# Patient Record
Sex: MALE | Race: WHITE | NOT HISPANIC OR LATINO | Employment: FULL TIME | URBAN - METROPOLITAN AREA
[De-identification: names, ages, dates, MRNs, and addresses within clinical notes are randomized per-mention and may not be internally consistent; named-entity substitution may affect disease eponyms.]

---

## 2017-01-19 ENCOUNTER — COMMUNICATION - HEALTHEAST (OUTPATIENT)
Dept: INTERNAL MEDICINE | Facility: CLINIC | Age: 42
End: 2017-01-19

## 2017-01-19 DIAGNOSIS — F17.200 NEEDS SMOKING CESSATION EDUCATION: ICD-10-CM

## 2017-03-06 ENCOUNTER — COMMUNICATION - HEALTHEAST (OUTPATIENT)
Dept: INTERNAL MEDICINE | Facility: CLINIC | Age: 42
End: 2017-03-06

## 2017-03-06 DIAGNOSIS — F17.200 NEEDS SMOKING CESSATION EDUCATION: ICD-10-CM

## 2017-05-08 ENCOUNTER — OFFICE VISIT - HEALTHEAST (OUTPATIENT)
Dept: INTERNAL MEDICINE | Facility: CLINIC | Age: 42
End: 2017-05-08

## 2017-05-08 ENCOUNTER — COMMUNICATION - HEALTHEAST (OUTPATIENT)
Dept: TELEHEALTH | Facility: CLINIC | Age: 42
End: 2017-05-08

## 2017-05-08 DIAGNOSIS — S99.912A LEFT ANKLE INJURY: ICD-10-CM

## 2017-05-30 ENCOUNTER — COMMUNICATION - HEALTHEAST (OUTPATIENT)
Dept: INTERNAL MEDICINE | Facility: CLINIC | Age: 42
End: 2017-05-30

## 2017-05-30 DIAGNOSIS — G47.00 INSOMNIA: ICD-10-CM

## 2017-10-26 ENCOUNTER — OFFICE VISIT - HEALTHEAST (OUTPATIENT)
Dept: INTERNAL MEDICINE | Facility: CLINIC | Age: 42
End: 2017-10-26

## 2017-10-26 DIAGNOSIS — K13.0 LIP LESION: ICD-10-CM

## 2017-10-26 DIAGNOSIS — G47.00 INSOMNIA: ICD-10-CM

## 2017-10-26 DIAGNOSIS — Z98.52 H/O VASECTOMY: ICD-10-CM

## 2017-10-26 DIAGNOSIS — Z00.00 ROUTINE GENERAL MEDICAL EXAMINATION AT A HEALTH CARE FACILITY: ICD-10-CM

## 2017-10-26 ASSESSMENT — MIFFLIN-ST. JEOR: SCORE: 1594.05

## 2017-10-27 ENCOUNTER — COMMUNICATION - HEALTHEAST (OUTPATIENT)
Dept: INTERNAL MEDICINE | Facility: CLINIC | Age: 42
End: 2017-10-27

## 2017-10-27 ENCOUNTER — AMBULATORY - HEALTHEAST (OUTPATIENT)
Dept: LAB | Facility: CLINIC | Age: 42
End: 2017-10-27

## 2017-10-27 DIAGNOSIS — Z98.52 STATUS POST VASECTOMY: ICD-10-CM

## 2017-10-27 LAB
CHOLEST SERPL-MCNC: 191 MG/DL
FASTING STATUS PATIENT QL REPORTED: YES
HDLC SERPL-MCNC: 59 MG/DL
LDLC SERPL CALC-MCNC: 103 MG/DL
PSA SERPL-MCNC: 1.5 NG/ML (ref 0–2.5)
TRIGL SERPL-MCNC: 147 MG/DL

## 2017-10-31 ENCOUNTER — COMMUNICATION - HEALTHEAST (OUTPATIENT)
Dept: INTERNAL MEDICINE | Facility: CLINIC | Age: 42
End: 2017-10-31

## 2017-10-31 ENCOUNTER — AMBULATORY - HEALTHEAST (OUTPATIENT)
Dept: LAB | Facility: CLINIC | Age: 42
End: 2017-10-31

## 2017-10-31 DIAGNOSIS — Z98.52 STATUS POST VASECTOMY: ICD-10-CM

## 2017-11-08 ENCOUNTER — RECORDS - HEALTHEAST (OUTPATIENT)
Dept: ADMINISTRATIVE | Facility: OTHER | Age: 42
End: 2017-11-08

## 2017-12-08 ENCOUNTER — COMMUNICATION - HEALTHEAST (OUTPATIENT)
Dept: INTERNAL MEDICINE | Facility: CLINIC | Age: 42
End: 2017-12-08

## 2017-12-08 DIAGNOSIS — G47.00 INSOMNIA: ICD-10-CM

## 2017-12-19 ENCOUNTER — OFFICE VISIT - HEALTHEAST (OUTPATIENT)
Dept: INTERNAL MEDICINE | Facility: CLINIC | Age: 42
End: 2017-12-19

## 2017-12-19 ENCOUNTER — RECORDS - HEALTHEAST (OUTPATIENT)
Dept: GENERAL RADIOLOGY | Facility: CLINIC | Age: 42
End: 2017-12-19

## 2017-12-19 DIAGNOSIS — M25.561 RIGHT KNEE PAIN: ICD-10-CM

## 2017-12-19 DIAGNOSIS — M22.2X1 PATELLOFEMORAL PAIN SYNDROME OF RIGHT KNEE: ICD-10-CM

## 2017-12-19 DIAGNOSIS — M25.561 PAIN IN RIGHT KNEE: ICD-10-CM

## 2017-12-21 ENCOUNTER — OFFICE VISIT - HEALTHEAST (OUTPATIENT)
Dept: PHYSICAL THERAPY | Facility: REHABILITATION | Age: 42
End: 2017-12-21

## 2017-12-21 DIAGNOSIS — R53.1 DECREASED STRENGTH: ICD-10-CM

## 2017-12-21 DIAGNOSIS — M25.561 PATELLOFEMORAL ARTHRALGIA OF RIGHT KNEE: ICD-10-CM

## 2018-01-04 ENCOUNTER — OFFICE VISIT - HEALTHEAST (OUTPATIENT)
Dept: PHYSICAL THERAPY | Facility: REHABILITATION | Age: 43
End: 2018-01-04

## 2018-01-04 DIAGNOSIS — R53.1 DECREASED STRENGTH: ICD-10-CM

## 2018-01-04 DIAGNOSIS — M25.561 PATELLOFEMORAL ARTHRALGIA OF RIGHT KNEE: ICD-10-CM

## 2018-01-18 ENCOUNTER — OFFICE VISIT - HEALTHEAST (OUTPATIENT)
Dept: PHYSICAL THERAPY | Facility: REHABILITATION | Age: 43
End: 2018-01-18

## 2018-01-18 DIAGNOSIS — M25.561 PATELLOFEMORAL ARTHRALGIA OF RIGHT KNEE: ICD-10-CM

## 2018-01-18 DIAGNOSIS — R53.1 DECREASED STRENGTH: ICD-10-CM

## 2018-02-01 ENCOUNTER — OFFICE VISIT - HEALTHEAST (OUTPATIENT)
Dept: PHYSICAL THERAPY | Facility: REHABILITATION | Age: 43
End: 2018-02-01

## 2018-02-01 DIAGNOSIS — M25.561 PATELLOFEMORAL ARTHRALGIA OF RIGHT KNEE: ICD-10-CM

## 2018-02-01 DIAGNOSIS — R53.1 DECREASED STRENGTH: ICD-10-CM

## 2018-02-06 ENCOUNTER — COMMUNICATION - HEALTHEAST (OUTPATIENT)
Dept: INTERNAL MEDICINE | Facility: CLINIC | Age: 43
End: 2018-02-06

## 2018-02-06 DIAGNOSIS — G47.00 INSOMNIA: ICD-10-CM

## 2018-03-13 ENCOUNTER — COMMUNICATION - HEALTHEAST (OUTPATIENT)
Dept: LAB | Facility: CLINIC | Age: 43
End: 2018-03-13

## 2018-03-13 ENCOUNTER — COMMUNICATION - HEALTHEAST (OUTPATIENT)
Dept: TELEHEALTH | Facility: CLINIC | Age: 43
End: 2018-03-13

## 2018-03-13 ENCOUNTER — AMBULATORY - HEALTHEAST (OUTPATIENT)
Dept: LAB | Facility: CLINIC | Age: 43
End: 2018-03-13

## 2018-03-13 DIAGNOSIS — Z20.2 STD EXPOSURE: ICD-10-CM

## 2018-03-13 LAB — HIV 1+2 AB+HIV1 P24 AG SERPL QL IA: NEGATIVE

## 2018-03-14 LAB
C TRACH DNA SPEC QL PROBE+SIG AMP: NEGATIVE
N GONORRHOEA DNA SPEC QL NAA+PROBE: NEGATIVE

## 2018-04-12 ENCOUNTER — COMMUNICATION - HEALTHEAST (OUTPATIENT)
Dept: INTERNAL MEDICINE | Facility: CLINIC | Age: 43
End: 2018-04-12

## 2018-04-12 DIAGNOSIS — G47.00 INSOMNIA: ICD-10-CM

## 2018-06-19 ENCOUNTER — COMMUNICATION - HEALTHEAST (OUTPATIENT)
Dept: INTERNAL MEDICINE | Facility: CLINIC | Age: 43
End: 2018-06-19

## 2018-06-19 DIAGNOSIS — G47.00 INSOMNIA: ICD-10-CM

## 2018-09-07 ENCOUNTER — COMMUNICATION - HEALTHEAST (OUTPATIENT)
Dept: INTERNAL MEDICINE | Facility: CLINIC | Age: 43
End: 2018-09-07

## 2018-09-07 DIAGNOSIS — G47.00 INSOMNIA: ICD-10-CM

## 2018-11-25 ENCOUNTER — COMMUNICATION - HEALTHEAST (OUTPATIENT)
Dept: INTERNAL MEDICINE | Facility: CLINIC | Age: 43
End: 2018-11-25

## 2018-11-25 DIAGNOSIS — G47.00 INSOMNIA: ICD-10-CM

## 2018-12-11 ENCOUNTER — OFFICE VISIT - HEALTHEAST (OUTPATIENT)
Dept: INTERNAL MEDICINE | Facility: CLINIC | Age: 43
End: 2018-12-11

## 2018-12-11 DIAGNOSIS — M77.11 LATERAL EPICONDYLITIS OF RIGHT ELBOW: ICD-10-CM

## 2018-12-11 DIAGNOSIS — S90.32XA BRUISED SOLE OF FOOT, LEFT, INITIAL ENCOUNTER: ICD-10-CM

## 2019-03-04 ENCOUNTER — COMMUNICATION - HEALTHEAST (OUTPATIENT)
Dept: INTERNAL MEDICINE | Facility: CLINIC | Age: 44
End: 2019-03-04

## 2019-03-04 DIAGNOSIS — G47.00 INSOMNIA: ICD-10-CM

## 2019-06-20 ENCOUNTER — COMMUNICATION - HEALTHEAST (OUTPATIENT)
Dept: INTERNAL MEDICINE | Facility: CLINIC | Age: 44
End: 2019-06-20

## 2019-06-20 DIAGNOSIS — G47.00 INSOMNIA: ICD-10-CM

## 2019-09-25 ENCOUNTER — COMMUNICATION - HEALTHEAST (OUTPATIENT)
Dept: INTERNAL MEDICINE | Facility: CLINIC | Age: 44
End: 2019-09-25

## 2019-09-25 DIAGNOSIS — G47.00 INSOMNIA: ICD-10-CM

## 2019-10-22 ENCOUNTER — OFFICE VISIT - HEALTHEAST (OUTPATIENT)
Dept: INTERNAL MEDICINE | Facility: CLINIC | Age: 44
End: 2019-10-22

## 2019-10-22 DIAGNOSIS — S46.911A STRAIN OF RIGHT SHOULDER, INITIAL ENCOUNTER: ICD-10-CM

## 2020-01-03 ENCOUNTER — OFFICE VISIT - HEALTHEAST (OUTPATIENT)
Dept: INTERNAL MEDICINE | Facility: CLINIC | Age: 45
End: 2020-01-03

## 2020-01-03 DIAGNOSIS — Z00.00 ROUTINE GENERAL MEDICAL EXAMINATION AT A HEALTH CARE FACILITY: ICD-10-CM

## 2020-01-03 DIAGNOSIS — M25.552 HIP PAIN, LEFT: ICD-10-CM

## 2020-01-03 DIAGNOSIS — S46.911A STRAIN OF RIGHT SHOULDER, INITIAL ENCOUNTER: ICD-10-CM

## 2020-01-03 DIAGNOSIS — G47.00 INSOMNIA: ICD-10-CM

## 2020-01-03 DIAGNOSIS — Z80.42 FAMILY HISTORY OF PROSTATE CANCER: ICD-10-CM

## 2020-01-03 LAB
CHOLEST SERPL-MCNC: 243 MG/DL
FASTING STATUS PATIENT QL REPORTED: ABNORMAL
FASTING STATUS PATIENT QL REPORTED: NORMAL
GLUCOSE BLD-MCNC: 89 MG/DL (ref 70–125)
HDLC SERPL-MCNC: 57 MG/DL
LDLC SERPL CALC-MCNC: 134 MG/DL
PSA SERPL-MCNC: 1.1 NG/ML (ref 0–2.5)
TRIGL SERPL-MCNC: 261 MG/DL

## 2020-01-03 ASSESSMENT — MIFFLIN-ST. JEOR: SCORE: 1650.46

## 2020-01-15 ENCOUNTER — OFFICE VISIT - HEALTHEAST (OUTPATIENT)
Dept: PHYSICAL THERAPY | Facility: REHABILITATION | Age: 45
End: 2020-01-15

## 2020-01-15 DIAGNOSIS — S46.911D MUSCLE STRAIN OF RIGHT SHOULDER, SUBSEQUENT ENCOUNTER: ICD-10-CM

## 2020-01-15 DIAGNOSIS — R53.1 DECREASED STRENGTH: ICD-10-CM

## 2020-01-15 DIAGNOSIS — M25.552 LEFT HIP PAIN: ICD-10-CM

## 2020-01-22 ENCOUNTER — OFFICE VISIT - HEALTHEAST (OUTPATIENT)
Dept: PHYSICAL THERAPY | Facility: REHABILITATION | Age: 45
End: 2020-01-22

## 2020-01-22 DIAGNOSIS — S46.911D MUSCLE STRAIN OF RIGHT SHOULDER, SUBSEQUENT ENCOUNTER: ICD-10-CM

## 2020-01-22 DIAGNOSIS — R53.1 DECREASED STRENGTH: ICD-10-CM

## 2020-01-22 DIAGNOSIS — M25.552 LEFT HIP PAIN: ICD-10-CM

## 2020-01-29 ENCOUNTER — OFFICE VISIT - HEALTHEAST (OUTPATIENT)
Dept: PHYSICAL THERAPY | Facility: REHABILITATION | Age: 45
End: 2020-01-29

## 2020-01-29 DIAGNOSIS — R53.1 DECREASED STRENGTH: ICD-10-CM

## 2020-01-29 DIAGNOSIS — S46.911D MUSCLE STRAIN OF RIGHT SHOULDER, SUBSEQUENT ENCOUNTER: ICD-10-CM

## 2020-01-29 DIAGNOSIS — M25.552 LEFT HIP PAIN: ICD-10-CM

## 2020-02-05 ENCOUNTER — OFFICE VISIT - HEALTHEAST (OUTPATIENT)
Dept: PHYSICAL THERAPY | Facility: REHABILITATION | Age: 45
End: 2020-02-05

## 2020-02-05 DIAGNOSIS — S46.911D MUSCLE STRAIN OF RIGHT SHOULDER, SUBSEQUENT ENCOUNTER: ICD-10-CM

## 2020-02-05 DIAGNOSIS — M25.552 LEFT HIP PAIN: ICD-10-CM

## 2020-02-05 DIAGNOSIS — R53.1 DECREASED STRENGTH: ICD-10-CM

## 2020-03-19 ENCOUNTER — COMMUNICATION - HEALTHEAST (OUTPATIENT)
Dept: INTERNAL MEDICINE | Facility: CLINIC | Age: 45
End: 2020-03-19

## 2020-03-19 DIAGNOSIS — G47.00 INSOMNIA: ICD-10-CM

## 2020-06-23 ENCOUNTER — COMMUNICATION - HEALTHEAST (OUTPATIENT)
Dept: INTERNAL MEDICINE | Facility: CLINIC | Age: 45
End: 2020-06-23

## 2020-06-23 DIAGNOSIS — G47.00 INSOMNIA: ICD-10-CM

## 2020-09-28 ENCOUNTER — COMMUNICATION - HEALTHEAST (OUTPATIENT)
Dept: INTERNAL MEDICINE | Facility: CLINIC | Age: 45
End: 2020-09-28

## 2020-09-28 DIAGNOSIS — G47.00 INSOMNIA: ICD-10-CM

## 2021-01-13 ENCOUNTER — COMMUNICATION - HEALTHEAST (OUTPATIENT)
Dept: INTERNAL MEDICINE | Facility: CLINIC | Age: 46
End: 2021-01-13

## 2021-01-13 DIAGNOSIS — G47.00 INSOMNIA: ICD-10-CM

## 2021-02-01 ENCOUNTER — OFFICE VISIT - HEALTHEAST (OUTPATIENT)
Dept: INTERNAL MEDICINE | Facility: CLINIC | Age: 46
End: 2021-02-01

## 2021-02-01 DIAGNOSIS — G47.00 INSOMNIA: ICD-10-CM

## 2021-02-01 DIAGNOSIS — Z00.00 ROUTINE GENERAL MEDICAL EXAMINATION AT A HEALTH CARE FACILITY: ICD-10-CM

## 2021-02-01 DIAGNOSIS — B07.0 PLANTAR WARTS: ICD-10-CM

## 2021-02-01 LAB
CHOLEST SERPL-MCNC: 225 MG/DL
FASTING STATUS PATIENT QL REPORTED: YES
FASTING STATUS PATIENT QL REPORTED: YES
GLUCOSE BLD-MCNC: 79 MG/DL (ref 70–125)
HDLC SERPL-MCNC: 60 MG/DL
LDLC SERPL CALC-MCNC: 148 MG/DL
TRIGL SERPL-MCNC: 85 MG/DL

## 2021-02-01 ASSESSMENT — MIFFLIN-ST. JEOR: SCORE: 1632.89

## 2021-05-05 ENCOUNTER — COMMUNICATION - HEALTHEAST (OUTPATIENT)
Dept: INTERNAL MEDICINE | Facility: CLINIC | Age: 46
End: 2021-05-05

## 2021-05-05 DIAGNOSIS — G47.00 INSOMNIA: ICD-10-CM

## 2021-05-06 RX ORDER — ZOLPIDEM TARTRATE 5 MG/1
5 TABLET ORAL
Qty: 30 TABLET | Refills: 0 | Status: SHIPPED | OUTPATIENT
Start: 2021-05-06 | End: 2021-07-14

## 2021-05-29 NOTE — TELEPHONE ENCOUNTER
Controlled Substance Refill Request  Medication:   Requested Prescriptions     Pending Prescriptions Disp Refills     zolpidem (AMBIEN) 5 MG tablet 30 tablet 0     Sig: Take 1 tablet (5 mg total) by mouth at bedtime as needed for sleep.     Date Last Fill: 3/7/19  Pharmacy: [id number]   Submit electronically to pharmacy  Controlled Substance Agreement on File:   Encounter-Level CSA Scan Date:    There are no encounter-level csa scan date.       Last office visit: Last office visit pertaining to requested medication was 12/11/18.

## 2021-05-30 VITALS — WEIGHT: 174.5 LBS

## 2021-05-31 VITALS — HEIGHT: 67 IN | WEIGHT: 165.44 LBS | BODY MASS INDEX: 25.97 KG/M2

## 2021-05-31 VITALS — WEIGHT: 161 LBS | BODY MASS INDEX: 25.22 KG/M2

## 2021-06-01 NOTE — TELEPHONE ENCOUNTER
Controlled Substance Refill Request  Medication:   Requested Prescriptions     Pending Prescriptions Disp Refills     zolpidem (AMBIEN) 5 MG tablet 30 tablet 0     Sig: Take 1 tablet (5 mg total) by mouth at bedtime as needed for sleep.     Date Last Fill: 6/24/19  Pharmacy: [id number]   Submit electronically to pharmacy  Controlled Substance Agreement on File:   Encounter-Level CSA Scan Date:    There are no encounter-level csa scan date.       Last office visit: Last office visit pertaining to requested medication was 12/11/18.

## 2021-06-02 VITALS — BODY MASS INDEX: 26.69 KG/M2 | WEIGHT: 170.4 LBS

## 2021-06-02 NOTE — PATIENT INSTRUCTIONS - HE
I think he strained your shoulder.    No evidence of fracture, dislocation, or tendon/ligament tear.    Continue with ibuprofen 600 mg 3 times daily.  Take this with food and water.    Heat to the area for 20 minutes 3 times per day.    Gentle stretching okay.  Try to avoid laying on your right side at night.    Follow-up with Dr. Hernandez Gray or eye in 2 weeks if your pain has not started to improve.

## 2021-06-02 NOTE — PROGRESS NOTES
Clinic Note    Assessment:     Assessment and Plan:  1. Strain of right shoulder, initial encounter  Shoulder testing today is reassuring.  He likely suffered a minor shoulder strain and should do well with conservative measures.  See patient instructions below for plan of care       Patient Instructions   I think he strained your shoulder.    No evidence of fracture, dislocation, or tendon/ligament tear.    Continue with ibuprofen 600 mg 3 times daily.  Take this with food and water.    Heat to the area for 20 minutes 3 times per day.    Gentle stretching okay.  Try to avoid laying on your right side at night.    Follow-up with Dr. Hernandez Gray or eye in 2 weeks if your pain has not started to improve.    Return in about 2 weeks (around 11/5/2019).         Subjective:      Brayden Peres is a 43 y.o. male comes to the clinic today with right shoulder pain.    A few days ago, he was playing some yard games with his family when he tripped over a toddler and landed on his right shoulder awkwardly.  He is unsure if his right arm was extended in front of him when he fell.    He had an acute sensation of severe right shoulder pain but did not seek medical evaluation until today.  He localizes the pain to the AC joint area and has some minimal/moderate discomfort with lateral arm raise and general range of motion.    He used some ibuprofen recently which seemed to help significantly.  He iced the shoulder immediately after the injury which seemed to help with swelling.    He has not noticed any crepitation, crackling, or popping sensation in his shoulder joint.    The following portions of the patient's history were reviewed and updated as appropriate: Allergies, medications, problems, prior note.    Review of Systems:    Review is otherwise negative except for what is mentioned above.     Social Hx:    Social History     Tobacco Use   Smoking Status Former Smoker     Packs/day: 0.50     Types: Cigarettes   Smokeless  Tobacco Never Used         Objective:     Vitals:    10/22/19 0824   BP: 117/68   Pulse: 67   SpO2: 96%   Weight: 178 lb 9.6 oz (81 kg)       Exam:    General: No apparent distress. Calm. Alert and Oriented X3. Pt behavior is appropriate.  Musculoskeletal: Normal Abdalla/Neer's.  No crepitation with range of motion.  Some mild tenderness with palpation to the anterior AC joint region  Neurologic: Interactive, alert, no focal findings, CNs intact.   Skin: Warm, dry. Normal hair pattern. Free of lesions. Normal skin turgor.       Patient Active Problem List   Diagnosis     Needs smoking cessation education     Current Outpatient Medications   Medication Sig Dispense Refill     MULTIVITAMIN ORAL Take by mouth.       zolpidem (AMBIEN) 5 MG tablet Take 1 tablet (5 mg total) by mouth at bedtime as needed for sleep. 30 tablet 0     No current facility-administered medications for this visit.            Elliot Edmond (Rob), CHARLIE    10/22/2019

## 2021-06-03 VITALS
BODY MASS INDEX: 27.97 KG/M2 | WEIGHT: 178.6 LBS | DIASTOLIC BLOOD PRESSURE: 68 MMHG | SYSTOLIC BLOOD PRESSURE: 117 MMHG | OXYGEN SATURATION: 96 % | HEART RATE: 67 BPM

## 2021-06-04 VITALS
DIASTOLIC BLOOD PRESSURE: 88 MMHG | HEART RATE: 74 BPM | SYSTOLIC BLOOD PRESSURE: 122 MMHG | BODY MASS INDEX: 27.78 KG/M2 | WEIGHT: 177 LBS | HEIGHT: 67 IN

## 2021-06-04 NOTE — PROGRESS NOTES
Assessment:      Healthy male exam.      Right shoulder pain and left hip pain    Family history of prostate cancer    Occasional insomnia, on Ambien.     Plan:     Check a glucose, lipid profile, and PSA.  His Ambien was refilled.  I am going to refer him to physical therapy for his right shoulder and hip pain.  Follow-up 1 year, earlier if needed.  Subjective:      Brayden Peres is a 44 y.o. male who presents for an annual exam.  He has been having some left hip and right shoulder pain for about the last 3 to 4 months.  He is interested in going to PT for these and an order was placed.  He is otherwise feeling well.  He is due for a PSA given his family history of prostate cancer.    Immunization History   Administered Date(s) Administered     Hep A / Hep B 11/13/2006, 12/12/2006     Influenza,seasonal quad, PF, =/> 6months 10/09/2013, 10/07/2015, 10/05/2016     Influenza,seasonal, Inj IIV3 10/20/2010, 10/17/2012     MMR 03/10/1993     Td, Adult, Absorbed 10/26/2017     Tdap 10/17/2012     Typhoid, Inj, Inactive 11/13/2006     Immunization status: up to date and documented.    No exam data present    Current Outpatient Medications   Medication Sig Dispense Refill     MULTIVITAMIN ORAL Take by mouth.       zolpidem (AMBIEN) 5 MG tablet Take 1 tablet (5 mg total) by mouth at bedtime as needed for sleep. 30 tablet 0     No current facility-administered medications for this visit.      History reviewed. No pertinent past medical history.  Past Surgical History:   Procedure Laterality Date     TX APPENDECTOMY      Description: Appendectomy;  Recorded: 08/30/2012;     TX REMOVAL OF SPERM DUCT(S)      Description: Surgery Of Male Genitalia Vasectomy;  Recorded: 11/13/2013;     Patient has no known allergies.  Family History   Problem Relation Age of Onset     Alzheimer's disease Mother      Alzheimer's disease Father      Prostate cancer Father      Heart disease Father      Stroke Paternal Grandmother      Social  History     Socioeconomic History     Marital status:      Spouse name: Not on file     Number of children: Not on file     Years of education: Not on file     Highest education level: Not on file   Occupational History     Not on file   Social Needs     Financial resource strain: Not on file     Food insecurity:     Worry: Not on file     Inability: Not on file     Transportation needs:     Medical: Not on file     Non-medical: Not on file   Tobacco Use     Smoking status: Former Smoker     Packs/day: 0.50     Types: Cigarettes     Smokeless tobacco: Never Used   Substance and Sexual Activity     Alcohol use: Not on file     Drug use: Not on file     Sexual activity: Not on file   Lifestyle     Physical activity:     Days per week: Not on file     Minutes per session: Not on file     Stress: Not on file   Relationships     Social connections:     Talks on phone: Not on file     Gets together: Not on file     Attends Alevism service: Not on file     Active member of club or organization: Not on file     Attends meetings of clubs or organizations: Not on file     Relationship status: Not on file     Intimate partner violence:     Fear of current or ex partner: Not on file     Emotionally abused: Not on file     Physically abused: Not on file     Forced sexual activity: Not on file   Other Topics Concern     Not on file   Social History Narrative     Not on file       Review of Systems  General:  Denies problem  Eyes: Denies problem  Ears/Nose/Throat: Denies problem  Cardiovascular: Denies problem  Respiratory:  Denies problem  Gastrointestinal:  Denies problem  Genitourinary: Denies problem  Musculoskeletal:  Denies problem  Skin: Denies problem  Neurologic: Denies problem  Psychiatric: Denies problem  Endocrine: Denies problem  Heme/Lymphatic: Denies problem   Allergic/Immunologic: Denies problem        Objective:     Vitals:    01/03/20 1600   BP: 122/88   Pulse: 74   Weight: 177 lb (80.3 kg)   Height: 5'  "7.25\" (1.708 m)     Body mass index is 27.52 kg/m .    Physical  General Appearance: Alert, cooperative, no distress, appears stated age  Head: Normocephalic, without obvious abnormality, atraumatic  Eyes: PERRL, conjunctiva/corneas clear, EOM's intact  Ears: Normal TM's and external ear canals, both ears  Nose: Nares normal, septum midline,mucosa normal, no drainage  Throat: Lips, mucosa, and tongue normal; teeth and gums normal  Neck: Supple, symmetrical, trachea midline, no adenopathy;  thyroid: not enlarged, symmetric, no tenderness/mass/nodules; no carotid bruit or JVD  Back: Symmetric, no curvature, ROM normal, no CVA tenderness  Lungs: Clear to auscultation bilaterally, respirations unlabored  Heart: Regular rate and rhythm, S1 and S2 normal, no murmur, rub, or gallop,  Abdomen: Soft, non-tender, bowel sounds active all four quadrants,  no masses, no organomegaly  Musculoskeletal: Normal range of motion. No joint swelling or deformity.   Extremities: Extremities normal, atraumatic, no cyanosis or edema  Skin: Skin color, texture, turgor normal, no rashes or lesions  Lymph nodes: Cervical, supraclavicular, and axillary nodes normal  Neurologic: He is alert. He has normal reflexes.   Psychiatric: He has a normal mood and affect.            "

## 2021-06-05 VITALS
DIASTOLIC BLOOD PRESSURE: 74 MMHG | SYSTOLIC BLOOD PRESSURE: 104 MMHG | HEIGHT: 67 IN | WEIGHT: 174 LBS | BODY MASS INDEX: 27.31 KG/M2 | HEART RATE: 60 BPM

## 2021-06-05 NOTE — PROGRESS NOTES
[facility] Rehabilitation Daily Progress     Patient Name: Brayden Peres  Date: 2020  Visit #: 2/8-10  Date of evaluation: 1/15/20  Referral Diagnosis: Strain of right shoulder, initial encounter  Referring provider: Hernandez Gray, *  Visit Diagnosis:     ICD-10-CM    1. Muscle strain of right shoulder, subsequent encounter S46.911D    2. Left hip pain M25.552    3. Decreased strength R53.1          Assessment:   Patient had a warm up routine that we reviewed and made a few corrections on exercises.  Also added a couple specific stretches.  He felt this was helpful in trying to figure out how to stretch certain muscles.   Patient is benefitting from skilled physical therapy and is making steady progress toward functional goals.  Patient is appropriate to continue with skilled physical therapy intervention, as indicated by initial plan of care.    Goal Status:  Pt. will be independent with home exercise program in : 6 weeks    Pt will: Able to reach up and grab plates or weighted objects to lift down with pain 0-2/10 within 8-10 visits  Pt will: Able to lift obejcts > 10#, such as laundry, with more ease as strength improves within 8-10 visits  Pt will: Able to walk with normal gait pattern and fully extend left leg within 8-10 visits  Pt will: Able to squat to  objects with pain 0-2/10 within 8-10 visits      Plan / Patient Education:              Review shoulder stretches and add as needed  Massage prn  Hamstring step up  Prefers Kike   Subjective:     Pain Ratin groin                        2-shoulder    Think my groin and hip feel slightly better.  I have not done much with my shoulder  I have been trying to be more mindful of my walking, not having my feet so far apart      Objective:     Patient Active Problem List   Diagnosis   (none) - all problems resolved or deleted       Exercises:  Exercise #1: current exercises: stretches with workouts.  standing hip flexor, standing  hip circles, kneeling hip flexor, hamstring sitting on on bent knee, knee forward-sitting piriformis  Comment #1:      downward dog,   Exercise #2: Pt to stop standing hip flexor stretch or make sure he is using good technique, continue kneeling hip flexor stretch, added sitting butterly/hip adductor   Comment #2: New stretches:  sitting hamstring, gastroc, soleus, supine piriformis, supine hip ER, supine ITB  Exercise #3:     hold 30 seconds x 1-3 reps  Comment #3: Pt's warm up routine:  corrections made on lunges, clamshells, sidelying hip abd, hip add, bridges, no corrections on the many others    Treatment Today     TREATMENT MINUTES COMMENTS   Evaluation     Self-care/ Home management     Manual therapy     Neuromuscular Re-education     Therapeutic Activity     Therapeutic Exercises 28 See flow sheet or above   Gait training     Modality__________________                Total 28    Blank areas are intentional and mean the treatment did not include these items.       Day Urena, PT  1/22/2020

## 2021-06-05 NOTE — PROGRESS NOTES
[facility]   Initial Evaluation    Patient Name: Brayden Peres  Date of evaluation: 1/15/2020  Referral Diagnosis: Strain of right shoulder, Left Hip   Referring provider: Hernandez Gray, *  Visit Diagnosis:     ICD-10-CM    1. Muscle strain of right shoulder, subsequent encounter S46.911D    2. Left hip pain M25.552    3. Decreased strength R53.1        Assessment:       Patient presents with two separate problems.  He has chronic shoulder and hip pain which both have been irritated within the past 3-4 months.  He fell on his right shoulder and the pain is much improved.  He continues to have some pain with certain reaching and lifting activities even after doing his own exercises.  His hip hurt after a stair workout and has not resolved.  He has pain to palpation in both areas and slight decrease in strength.  He does his own workouts but wonders about technique and if he is continuing to irritate his injuries.  Feel he will benefit from education, modalities for pain control and stretches and strengthening to improve his functional abilities.    He felt the review of some of the exercises today was helpful and start to make some of the changes.  Will look at the rest of his program next visit.        Goals:  Pt. will be independent with home exercise program in : 6 weeks    Pt will: Able to reach up and grab plates or weighted objects to lift down with pain 0-2/10 within 8-10 visits  Pt will: Able to lift obejcts > 10#, such as laundry, with more ease as strength improves within 8-10 visits  Pt will: Able to walk with normal gait pattern and fully extend left leg within 8-10 visits  Pt will: Able to squat to  objects with pain 0-2/10 within 8-10 visits      Patient's expectations/goals are realistic.    Barriers to Learning or Achieving Goals:  none       Plan / Patient Instructions:        Plan of Care:   Authorization / Certification Start Date: 01/15/19  Authorization /  Certification Number of Visits: 8-10  Communication with: Referral Source  Patient Related Instruction: Nature of Condition;Treatment plan and rationale;Basis of treatment;Self Care instruction;Posture  Times per Week: 1-2  Number of Weeks: 8-12  Number of Visits: 8-10  Therapeutic Exercise: ROM;Stretching;Strengthening  Neuromuscular Reeducation: kinesio tape;posture;core  Manual Therapy: soft tissue mobilization;myofascial release;joint mobilization;muscle energy  Modalities: electrical stimulation;ultrasound      Plan for next visit:   Intro: stretches: sitting hamstring, piriformis, hip IR, gastroc, ITB           Review shoulder stretches and add as needed  Massage prn  Prefers Kike      Subjective:         History of Present Illness:    Brayden is a 44 y.o. male who presents to therapy today with complaints of right shoulder and left hip which have been painful for about 3-4 months with an insidious onset. He landed on his right shoulder when he was playing with his kids.  He continues to have pain and does exercises on his own for his rehabilitation but it doesn't seem to be helping.  He would like to know what to do to help it heal.  He did have an AC Sprain in  in this shoulder.  Reports always having sore groin and hip pain for many years.  Over a month ago he pulled something in his groin and it is still painful.  He has had groin pulls in the past.  Symptoms are intermittent and not improving.      Pain Ratin}  Pain rating at best: 0  Pain rating at worst: 5  Pain description: aching, burning, dull, numbness, pain, sharp, soreness, tingling and weakness    Functional limitations are described as occurring with:   reach up and lift anything, reaching out to side, lifting from floor, walking, squatting    Patient reports benefit from:  rest, his own exercises on both, yoga       Objective:      Patient Outcome Measures :    Shoulder Pain and Disability Index (SPADI) in %: 21     Scores range from  0-100%, where a score of 0% represents minimal pain and maximal function. The minimal clincically important difference is a score reduction of 10%.  Lower Extremity Functional Scale (_/80): 67     Scores range from 0-80, where a score of 80 represents maximum function. The minimal clinically important difference is a positive change of 9 points.    Examination  1. Muscle strain of right shoulder, subsequent encounter     2. Left hip pain     3. Decreased strength       Involved side: Right shoulder, Left Hip  Left side dominant  Posture Observation:      General standing posture is fair with very wide base of support.    Cervical AROM: normal AROM    Shoulder AROM: normal AROM                               Pain on right with arm straight out into position for flexion or abduction                                R IR/ext: T5                                 L IR/ext T6      Hip AROM: hip IR 10 degrees bilaterally                    Knees are hypermobile    UE Strength: right shoulder flexion, abduction IR 4 to 4+/5    LE Strength: normal throughout    1 leg stance:  20+ seconds but reverse hip hike bilaterally  Squatting:  No deviations noted    Palpation: Shoulder:                       Bilateral infraspinatus, biceps tendon in groove, RC insertions                     Hip:                       Left: adductor near origin and 1/3 way inferior into muscle, superior 1/3 of ITB,     Gait: walks with excessively wide base of support, lateral edge of feet, decrease hip extension on left    Flexibility:  Moderately tight hamstring on right, mild on left    Shoulder Special Tests  Impingement Cluster Right (+/-) Left (+/-) Rotator Cuff Tests Right (+/-) Left (+/-)   Abdalla-Jim - - Drop Arm Sign - -   Painful Arc - - Hornblowers     Infraspinatus Test   ERLS     AC Tests Right (+/-) Left (+/-) IRLS     Active Compression - - Labral Tests Right (+/-) Left (+/-)   Crossbody Adduction   Biceps Load Test II     AC Resisted  Extension   Jerk Test     GH Instability Tests Right (+/-) Left (+/-) Isela Test     Sulcus Sign   Biceps Tests Right (+/-) Left (+/-)   Apprehension - - Speed + -   Relocation   Jennifer philip     Other:   Other:     Other:   Other:           Hip Special Tests  OA Right (+/-) Left (+/-) Intra Articular Right (+/-) Left (+/-)   Hip Scour - - PEPPER - -   Test Cluster  -Hip pain  -Hip IR <15   -Hip Flex <115    FADIR     Test Cluster  -Painful Hip IR  ->50 years old  -Morning Stiffness <60 min   Passive Supine Rotation Test     Misc. Right (+/-) Left (+/-) Stinchfield Test (SLR Against Resistance)     Ely s  - DEXRIT     Dru s  - DIRI     Trendelenburg + for reverse Trendelenberg + for reverse Trendelenberg Posterior Rim Impingement - -   SIJ Right (+/-) Left (+/-) Lateral Rim Impingement     SIJ Compression   Other     SIJ Distraction   Other     POSH Test   Other     Sacral Thrust   Other       Exercises:  Exercise #1: current exercises: stretches with workouts.  standing hip flexor, standing hip circles, kneeling hip flexor, hamstring sitting on on bent knee, knee forward-sitting piriformis  Comment #1:      downward dog,   Exercise #2: Pt to stop standing hip flexor stretch or make sure he is using good technique, continue kneeling hip flexor stretch, added sitting butterly/hip adductor       Treatment Today     TREATMENT MINUTES COMMENTS   Evaluation 30 Shoulder/hip   Self-care/ Home management     Manual therapy     Neuromuscular Re-education     Therapeutic Activity     Therapeutic Exercises 30 See above or flow sheet  Discussed walking heel to toe and decreasing base of support   Gait training     Modality__________________                Total 60    Blank areas are intentional and mean the treatment did not include these items.         PT Evaluation Code: (Please list factors)  Patient History/Comorbidities: No past medical history on file.  Examination: pain to palpation in hip and shoulder, decrease in  strength  Clinical Presentation: stable  Clinical Decision Making: low    Patient History/  Comorbidities Examination  (body structures and functions, activity limitations, and/or participation restrictions) Clinical Presentation Clinical Decision Making (Complexity)   No documented Comorbidities or personal factors 1-2 Elements Stable and/or uncomplicated Low   1-2 documented comorbidities or personal factor 3 Elements Evolving clinical presentation with changing characteristics Moderate   3-4 documented comorbidities or personal factors 4 or more Unstable and unpredictable High       Day Urena, PT  1/15/2020  6:45 AM

## 2021-06-05 NOTE — PROGRESS NOTES
[facility] Discharge Summary  Patient Name: Brayden Peres  Date: 4/2/2020  Referral Diagnosis: Shoulder and Hip Pain  Referring provider: Hernandez Gray, *  Visit Diagnosis:   1. Muscle strain of right shoulder, subsequent encounter     2. Left hip pain     3. Decreased strength         Goals:  Pt. will be independent with home exercise program in : 6 weeks    Pt will: Able to reach up and grab plates or weighted objects to lift down with pain 0-2/10 within 8-10 visits  Pt will: Able to lift obejcts > 10#, such as laundry, with more ease as strength improves within 8-10 visits  Pt will: Able to walk with normal gait pattern and fully extend left leg within 8-10 visits  Pt will: Able to squat to  objects with pain 0-2/10 within 8-10 visits      Patient was seen for 4 visits from 1/15/20 to 2/5/20 with 0 missed appointments.  Patient wasn't sure he was progressing as hoped with physical therapy.  He canceld his last scheduled visits.      Therapy will be discontinued at this time.  The patient will need a new referral to resume.    Thank you for your referral.  Day Urena, PT  4/2/2020  9:05 AM    [facility] Rehabilitation Daily Progress     Patient Name: Brayden Peres  Date: 2/5/2020  Visit #: 4/8-10  Date of evaluation: 1/15/20  Referral Diagnosis: Strain of right shoulder, initial encounter  Referring provider: Hernandez Gray, *  Visit Diagnosis:     ICD-10-CM    1. Muscle strain of right shoulder, subsequent encounter S46.911D    2. Left hip pain M25.552    3. Decreased strength R53.1          Assessment:   Patient did not have any pain with the new exercises but continues to have some pain in the shoulder.  Discussed possibly trying modalities next visit if the pain continues and he is open to trying it.      Patient is benefitting from skilled physical therapy and is making steady progress toward functional goals.  Patient is appropriate to continue  with skilled physical therapy intervention, as indicated by initial plan of care.    Goal Status: ongoing  Pt. will be independent with home exercise program in : 6 weeks    Pt will: Able to reach up and grab plates or weighted objects to lift down with pain 0-2/10 within 8-10 visits  Pt will: Able to lift obejcts > 10#, such as laundry, with more ease as strength improves within 8-10 visits  Pt will: Able to walk with normal gait pattern and fully extend left leg within 8-10 visits  Pt will: Able to squat to  objects with pain 0-2/10 within 8-10 visits      Plan / Patient Education:              Review shoulder stretches and add as needed    Reassess for modalities like Massage prn  Hamstring step up    Prefers Kike   Subjective:     Pain Ratin groin                        2-shoulder    I have a cold so not feeling well  I have been feeling stable since I was here last.  I haven't done as many shoulder exercises as I would like.  It doesn't feel better but it doesn't feel worse.  Have not tried ice so will do that before the next visit    Objective:     Patient Active Problem List   Diagnosis   (none) - all problems resolved or deleted       Exercises:  Exercise #1: current exercises: stretches with workouts.  standing hip flexor, standing hip circles, kneeling hip flexor, hamstring sitting on on bent knee, knee forward-sitting piriformis  Comment #1:      downward dog,   Exercise #2: Pt to stop standing hip flexor stretch or make sure he is using good technique, continue kneeling hip flexor stretch, added sitting butterly/hip adductor   Comment #2: New stretches:  sitting hamstring, gastroc, soleus, supine piriformis, supine hip ER, supine ITB  Exercise #3:     hold 30 seconds x 1-3 reps  Comment #3: Pt's warm up routine:  corrections made on lunges, clamshells, sidelying hip abd, hip add, bridges, no corrections on the many others  Exercise #4: current upper body:  shoulder circles with arms out  "(corrected to thumbs up) forward and back, arm presses up-no weights  Comment #4:     prone shoulder flexion \"I, \"Y and \"T\", sidelying with weight shoulder ER 5#, push-ups, burpies, standing \"Y\" with weight  Exercise #6:     push-up position-raising every other arm,   Comment #6: green tband:  shoulder extension, scapular row, shoulder ER, shoudler IR,  shoulder diagonal, biceps and triceps curl  Exercise #7:     10-20 reps  Comment #7: wall bartolo  X 20  Exercise #8: reverse wall push-up  X hold 10 seconds X 6-12 reps    Treatment Today     TREATMENT MINUTES COMMENTS   Evaluation     Self-care/ Home management     Manual therapy     Neuromuscular Re-education     Therapeutic Activity     Therapeutic Exercises 25 See flow sheet or above  Green tband issued   Gait training     Modality__________________                Total 25    Blank areas are intentional and mean the treatment did not include these items.       Day Urena, PT  2/5/2020    "

## 2021-06-05 NOTE — PROGRESS NOTES
[facility] Rehabilitation Daily Progress     Patient Name: Brayden Peres  Date: 2020  Visit #: 3/8-10  Date of evaluation: 1/15/20  Referral Diagnosis: Strain of right shoulder, initial encounter  Referring provider: Hernandez Gray, *  Visit Diagnosis:     ICD-10-CM    1. Muscle strain of right shoulder, subsequent encounter S46.911D    2. Left hip pain M25.552    3. Decreased strength R53.1          Assessment:   Patient had a warm up routine and exercises he does for his arms/shoulders that we reviewed and made a few corrections on technique.  He felt the back/hips are starting to feel better so wanted to work on his shoulders.  At times he will compensate his movement with his right shoulder and he is aware of this.  He will now be more mindful to make sure to use good technique to make sure muscles work appropriately     Patient is benefitting from skilled physical therapy and is making steady progress toward functional goals.  Patient is appropriate to continue with skilled physical therapy intervention, as indicated by initial plan of care.    Goal Status:  Pt. will be independent with home exercise program in : 6 weeks    Pt will: Able to reach up and grab plates or weighted objects to lift down with pain 0-2/10 within 8-10 visits  Pt will: Able to lift obejcts > 10#, such as laundry, with more ease as strength improves within 8-10 visits  Pt will: Able to walk with normal gait pattern and fully extend left leg within 8-10 visits  Pt will: Able to squat to  objects with pain 0-2/10 within 8-10 visits      Plan / Patient Education:              Review shoulder stretches and add as needed  Massage prn  Hamstring step up  Bienvenido Stokes   Subjective:     Pain Ratin groin                        2-shoulder    I continue to see improvement.  More of the soreness is decreasing  I did some XX skiing this weekend and did not have any increased pain    Objective:     Patient Active  "Problem List   Diagnosis   (none) - all problems resolved or deleted       Exercises:  Exercise #1: current exercises: stretches with workouts.  standing hip flexor, standing hip circles, kneeling hip flexor, hamstring sitting on on bent knee, knee forward-sitting piriformis  Comment #1:      downward dog,   Exercise #2: Pt to stop standing hip flexor stretch or make sure he is using good technique, continue kneeling hip flexor stretch, added sitting butterly/hip adductor   Comment #2: New stretches:  sitting hamstring, gastroc, soleus, supine piriformis, supine hip ER, supine ITB  Exercise #3:     hold 30 seconds x 1-3 reps  Comment #3: Pt's warm up routine:  corrections made on lunges, clamshells, sidelying hip abd, hip add, bridges, no corrections on the many others  Exercise #4: current upper body:  shoulder circles with arms out (corrected to thumbs up) forward and back, arm presses up-no weights  Comment #4:     prone shoulder flexion \"I, \"Y and \"T\", sidelying with weight shoulder ER 5#, push-ups, burpies, standing \"Y\" with weight  Exercise #6:     push-up position-raising every other arm,     Treatment Today     TREATMENT MINUTES COMMENTS   Evaluation     Self-care/ Home management     Manual therapy     Neuromuscular Re-education     Therapeutic Activity     Therapeutic Exercises 28 See flow sheet or above  edu on ice massage   Gait training     Modality__________________                Total 28    Blank areas are intentional and mean the treatment did not include these items.       Day Urena, PT  1/29/2020    "

## 2021-06-07 NOTE — TELEPHONE ENCOUNTER
RN cannot approve Refill Request    RN can NOT refill this medication med is not covered by policy/route to provider. Last office visit: 12/19/2017 Hernandez Gray MD Last Physical: 1/3/2020 Last MTM visit: Visit date not found Last visit same specialty: 10/22/2019 Elliot Edmond CNP.  Next visit within 3 mo: Visit date not found  Next physical within 3 mo: Visit date not found      [facility]/2020    Requested Prescriptions   Pending Prescriptions Disp Refills     zolpidem (AMBIEN) 5 MG tablet 30 tablet 0     Sig: Take 1 tablet (5 mg total) by mouth at bedtime as needed for sleep.       Controlled Substances Refill Protocol Failed - 3/19/2020  6:59 PM        Failed - Route all Controlled Substance Requests to Provider        Failed - Patient has controlled substance agreement in past 12 months     Encounter-Level CSA Scan Date:    There are no encounter-level csa scan date.               Passed - Visit with PCP or prescribing provider visit in past 12 months      Last office visit with prescriber/PCP: 12/19/2017 Hernandez Gray MD OR same dept: 10/22/2019 Elliot Edmond CNP OR same specialty: 10/22/2019 Elliot Edmond CNP Last physical: 1/3/2020 Last MTM visit: Visit date not found    Next visit within 3 mo: Visit date not found  Next physical within 3 mo: Visit date not found  Prescriber OR PCP: Hernandez Gray MD  Last diagnosis associated with med order: 1. Insomnia  - zolpidem (AMBIEN) 5 MG tablet; Take 1 tablet (5 mg total) by mouth at bedtime as needed for sleep.  Dispense: 30 tablet; Refill: 0

## 2021-06-09 NOTE — TELEPHONE ENCOUNTER
Controlled Substance Refill Request  Medication Name:   Requested Prescriptions     Pending Prescriptions Disp Refills     zolpidem (AMBIEN) 5 MG tablet 30 tablet 0     Sig: Take 1 tablet (5 mg total) by mouth at bedtime as needed for sleep.     Date Last Fill: 3/20/20  [facility]  Submit electronically to pharmacy  Controlled Substance Agreement on file:   Encounter-Level CSA Scan Date:    There are no encounter-level csa scan date.        Last office visit:  1/3/20

## 2021-06-10 NOTE — PROGRESS NOTES
[facility] Clinic Note  Patient Name: Brayden Peres  Patient Age: 41 y.o.  YOB: 1975  MRN: [id number]  ?  Date of Visit: 5/8/2017  Reason for Office Visit:   Chief Complaint   Patient presents with     Ankle Injury      x 1/d     HPI: Brayden Peres 41 y.o. who presents to clinic for left ankle injury. He was running yesterday and twisted ankle in a pot hole. His left ankle inverted. He was able to get up and bear weight, limp home. He has swelling and bruising over lateral side of ankle and foot. He has been icing, using ace, and taking ibuprofen. He walked into clinic. No tingling/numbness.     Review of Systems: As noted in HPI     Current Scheduled Meds:  Outpatient Encounter Prescriptions as of 5/8/2017   Medication Sig Dispense Refill     CHANTIX STARTING MONTH BOX 0.5 mg (11)- 1 mg (42) tablet FOLLOW PACKAGE DIRECTIONS. GIVE WITH MEALS AND WITH A FULL GLASS OF WATER 53 tablet 0     MULTIVITAMIN ORAL Take by mouth.       zolpidem (AMBIEN) 5 MG tablet Take 1 tablet (5 mg total) by mouth bedtime as needed for sleep. 30 tablet 2     No facility-administered encounter medications on file as of 5/8/2017.        Objective / Physical Examination:  /78 (Patient Site: Right Arm, Patient Position: Sitting, Cuff Size: Adult Regular)  Pulse 72  Wt 174 lb 8 oz (79.2 kg)  Wt Readings from Last 3 Encounters:   05/08/17 174 lb 8 oz (79.2 kg)   12/19/16 172 lb 14.4 oz (78.4 kg)   04/11/16 169 lb (76.7 kg)     There is no height or weight on file to calculate BMI. (>25?)    General Appearance: Alert and oriented in no acute distress  Extremities: left foot, swelling and ecchymoses laterally. Neg squeeze test. No bony tenderness. Mild tenderness around ATFL. Pulses 2+. Normal ankle flexion/extension/inversion/eversion.   Integumentary: Warm and dry.    Assessment / Plan / Medical Decision Making:      Encounter Diagnoses   Name Primary?     Left ankle injury Yes        1. Left ankle  injury    Consistent with low ankle sprain  Deferred from imaging today given history and exam  RICE   Follow up if not improving in 7-10 days     Total time spent with patient was 15 minutes with >50% of time spent in face-to-face counseling regarding the above plan     Josue Calixto MD  [facility]

## 2021-06-11 NOTE — TELEPHONE ENCOUNTER
Controlled Substance Refill Request  Medication Name:   Requested Prescriptions     Pending Prescriptions Disp Refills     zolpidem (AMBIEN) 5 MG tablet 30 tablet 0     Sig: Take 1 tablet (5 mg total) by mouth at bedtime as needed for sleep.     Date Last Fill: 6/24/20  [facility]  Submit electronically to pharmacy  Controlled Substance Agreement on file:   Encounter-Level CSA Scan Date:    There are no encounter-level csa scan date.        Last office visit:  1/3/20

## 2021-06-13 NOTE — PROGRESS NOTES
Kike comes in today for a physical exam.  Please see the physical exam forms a and B for further details, including a complete review of systems.    ILLNESSES, HOSPITALIZATIONS, AND OPERATIONS:    #1.  None    Kike has a few minor problems that he wants to discuss with me.  He is due for a refill of his Ambien.  He is looking to get a vasectomy and is looking for a referral for this.  He stopped smoking about 6-7 months ago and I congratulated him on this feat.  He has a dark lesion on his lip that he states has been growing slightly over the last few months.  He is looking to see a dermatologist for this.  He has a family history of prostate cancer and is due for screening labs.  He is otherwise well.    OBJECTIVE:    In general the patient is alert pleasant and in no acute distress.    HEENT: Pupils equal round reactive light.  Oropharynx is clear.  Lymphatic shows no anterior posterior cervical lymphadenopathy.  No thyromegaly or other masses noted.    Cardiovascular: S1-S2 regular in rhythm no murmurs gallops rubs    Lungs are clear    Abdomen is soft nontender nondistended.  No HSM.    Extremities show no pedal edema present bilaterally.  DP pulses are 2+ and normal bilaterally.    Skin exam shows no concerning skin lesions.    Assessment and plan: Physical exam along with healthcare maintenance.  Check a PSA, glucose, and lipid profile.  He was given a referral to dermatology consultants.  Tetanus vaccine was given today.  Follow-up 1 year, earlier if needed.

## 2021-06-14 NOTE — PROGRESS NOTES
Kike comes in today for evaluation of right knee pain.  He states that this is been going on for about the last 3 weeks and occurred after a backpacking trip he took to [facility] over Thanksgiving.  He localizes the pain to the anterior aspect of the knee and denies any swelling.  It gets worse when he is going downstairs and when walking down hills.  He denied any injury during his backpacking trip.  He states that he has had problems with the right knee many years ago, but has been about 10 years since he is dealt with knee pain.  He is otherwise well.    Objective: Vital signs are as per the EMR.  In general patient is alert pleasant and in no acute distress.  He appears healthy.    Right knee: Range of motion 0-140 .  No effusion is present.  No medial lateral joint line tenderness is present.  He has significant medial patellar facet tenderness present.  Strength is 5 out of 5 to resisted hip abduction on the left and 4 out of 5 on the right.    Imaging: 3 views of the bilateral knees were obtained today.  No osteoarthritic changes are noted.    Assessment and plan: Right patellofemoral syndrome.  I am going to refer him to physical therapy.  He is not improved in 4-6 weeks we should see him back for further evaluation.

## 2021-06-14 NOTE — TELEPHONE ENCOUNTER
Controlled Substance Refill Request  Medication Name:   Requested Prescriptions     Pending Prescriptions Disp Refills     zolpidem (AMBIEN) 5 MG tablet 30 tablet 0     Sig: Take 1 tablet (5 mg total) by mouth at bedtime as needed for sleep.     Date Last Fill: 9/30/20  [facility]ed [facility]  Submit electronically to pharmacy  Controlled Substance Agreement on file:   Encounter-Level CSA Scan Date:    There are no encounter-level csa scan date.        Last office visit:  1/3/20

## 2021-06-14 NOTE — PROGRESS NOTES
[facility]   Knee Initial Evaluation    Patient Name: Brayden Peres  Date of evaluation: 12/21/2017  Referral Diagnosis: Patellofemoral pain syndrome of right knee  Referring provider: Hernandez Gray, *  Visit Diagnosis:     ICD-10-CM    1. Patellofemoral arthralgia of right knee M25.561    2. Decreased strength R53.1        Assessment:      Impairments in  pain, posture, joint mobility, strength, coordination, gait/locomotion and balance  Patient's signs and symptoms are consistent with right patellofemoral syndrom.  Patient responded well to manual therapy and initiation of HEP..  Prognosis to achieve goals is  good   Pt. is appropriate for skilled PT intervention as outlined in the Plan of Care (POC).    Goals:  Pt. will demonstrate/verbalize independence in self-management of condition in : 4 weeks  Pt. will be able to walk : 30 minutes;with no pain;for community mobility;for exercise/recreation;in 4 weeks  Patient will ascend / descend: stairs;curb;with no pain;in 4 weeks  No Data Recorded    Goals and POC developed in cooperation with the patient. His expectations and goals are realistic.      Barriers to Learning or Achieving Goals:  No Barriers.       Plan / Patient Instructions:      Plan of Care:   Communication with: Referral Source  Patient Related Instruction: Nature of Condition;Treatment plan and rationale;Self Care instruction;Basis of treatment;Expected outcome;Next steps;Precautions;Posture;Body mechanics  Times per Week: 1  Number of Weeks: 6  Number of Visits: 6  Discharge Planning: to include HEP  Precautions / Restrictions : none  Therapeutic Exercise: Stretching;Strengthening  Neuromuscular Reeducation: kinesio tape;posture;balance/proprioception;core  Manual Therapy: soft tissue mobilization;myofascial release;joint mobilization  Modalities: ultrasound;cold pack;hot pack (trials as appropriate)    Plan for next visit: re-assess squat, single leg squat,  EO and EC SLS.  " Continue manual therapy.     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  Onset was gradual and worse during the descending portion of the hike. Symptoms are intermittent. He reports pain with walking, descending>ascending stairs and while walking on uneven surfaces.  He has a remote history of ligament sprain or fraying on the same knee.  He describes their previous level of function as not limited    Pain Ratin  Pain rating at best: 0  Pain rating at worst: 7  Pain description:aching, pain and sharp    Functional limitations are described as occurring with:   ascending and descending stairs or curbs  balance  walking             Objective:      Note: Items left blank indicates the item was not performed or not indicated at the time of the evaluation.    Patient Outcome Measures :    Lower Extremity Functional Scale (_/80): 56   Scores range from 0-80, where a score of 80 represents maximum function. The minimal clinically important difference is a positive change of 9 points.    Knee Examination  1. Patellofemoral arthralgia of right knee     2. Decreased strength       Precautions/Restrictions:  None  Involved Side: Right  Posture Observation:      General sitting posture is  normal.  General standing posture is normal.  Assistive Device: None  Gait Observation: no limp, some mild increase in lower leg ER bilateral  Lumbar Clearing: Does not provoke symptoms  Hip Clearing: Does not provoke symptoms    Knee ROM      Date:  17    AROM in degrees  Right   Left       Knee Flexion  (130 )   WNL   WNL         Knee Extension  (0 )   WNL   WNL     PROM in degrees  Right   Left       Knee Flexion  (130 )             Knee Extension  (0 )           LE Strength                 Date:  17   " "Strength (MMT/5)  Right   Left       Hip Flexion   5   5       Hip Abduction   4   4       Hip Adduction   5   5       Hip Extension   5   5       Hip Internal Rotation             Hip External Rotation             Knee Extension   5   5       Knee Flexion   5   5       Ankle Dorsiflexion   5   5       Ankle Plantarflexion           Flexibility:  Limited quad and hamstrings    Palpation:  Myofascial restriction noted in right quad, hamstring, gastroc and fibularis longus.    Knee Special Tests (+/-):       Knee OA Cluster   Right   Left   Ligament Tests   Right   Left    1. > 49 y/o   -        Lachman   -       2. Stiffness > 30 min.   -        Anterior Drawer          3. Crepitus   -        Posterior Drawer          4. Bony tenderness   -        Posterior Sag          5. Bone enlargement   -        Valgus Stress   -       6. No warmth to the touch   -        Varus Stress   -        Meniscal Tests   Right   Left    Other   Right    Left       Amor's   -        Ely's             Joint line tenderness   -        Drushabbir Adrianey's                      Patella position is inferior and tilted laterally at rest.  It is hypomobile and tender.      Treatment Today       Therapeutic Exercises:  Exercise #1: gastroc stretch  Comment #1: 30\" x 2  Exercise #2: hamstring stretch   Comment #2: 30\" x 2   Exercise #3: quad stretch  Comment #3: 30\" x 2   Exercise #4: sidelying hip abduction  Comment #4: 10  Exercise #5: sidelying clamshells  Comment #5: 10  Exercise #6: bridging  Comment #6: 10  Exercise #7: SLS  Comment #7: instruction for eyes open and eyes closed.  Exercise #8: verbal instruction and review for all exercises in HEP, all questions answered.   Manual piriformis stretch right 30\" x 2     Manual therapy:  MFR layers 1-3 right quad, hamstrings, gastroc, fibularis    Manual therapy:  right patella medial mobilization    Rate/grade Target  Direction  Relative movement " Location in range Patient position   2,3 Lateral border of patella Medial   Medial glide of patella on the femoral groove. Full knee extension  Supine        Manual therapy:  right patella superior mobilization    Rate/grade Target  Direction  Relative movement Location in range Patient position   2,3 Inferior border of patella Superior   Superior glide of patella in the femoral groove. Full knee extension  Supine        Manual therapy:  right patella inferior mobilization for knee flexion    Rate/grade Target  Direction  Relative movement Location in range Patient position   2,3 Superior pole of patella Inferior    Inferior glide of patella in the femoral groove. Varying degrees of knee flexion from full extension to end-range flexion. Supine                  TREATMENT MINUTES COMMENTS   Evaluation 20 Knee    Self-care/ Home management     Manual therapy 30 See above   Neuromuscular Re-education     Therapeutic Activity     Therapeutic Exercises 10 See above   Gait training     Modality__________________                Total 60    Blank areas are intentional and mean the treatment did not include these items.     PT Evaluation Code: (Please list factors)  Patient History/Comorbidities: none  Examination: as above  Clinical Presentation: stable  Clinical Decision Making: low    Patient History/  Comorbidities Examination  (body structures and functions, activity limitations, and/or participation restrictions) Clinical Presentation Clinical Decision Making (Complexity)   No documented Comorbidities or personal factors 1-2 Elements Stable and/or uncomplicated Low   1-2 documented comorbidities or personal factor 3 Elements Evolving clinical presentation with changing characteristics Moderate   3-4 documented comorbidities or personal factors 4 or more Unstable and unpredictable High               Brayden Vance  12/21/2017  1:13 PM

## 2021-06-14 NOTE — PROGRESS NOTES
Assessment:      Healthy male exam.      Chronic insomnia, on zolpidem.     Plan:      Check a lipid profile and glucose.  Prostate and colon cancer screening will not begin till age 50.  Vaccinations are up-to-date.  Follow-up 1 year, earlier if needed.    Subjective:      Brayden Peres is a 45 y.o. male who presents for an annual exam.  He is feeling well today and has no acute complaints.  He has been working since the pandemic began.    Immunization History   Administered Date(s) Administered     Hep A / Hep B 11/13/2006, 12/12/2006     INFLUENZA,SEASONAL QUAD, PF, =/> 6months 10/09/2013, 10/07/2015, 10/05/2016     Influenza, inj, historic,unspecified 09/15/2020     Influenza,seasonal, Inj IIV3 10/20/2010, 10/17/2012     MMR 03/10/1993     Td, Adult, Absorbed 10/26/2017     Tdap 10/17/2012     Typhoid, Inj, Inactive 11/13/2006     Immunization status: up to date and documented.    No exam data present    Current Outpatient Medications   Medication Sig Dispense Refill     MULTIVITAMIN ORAL Take by mouth.       zolpidem (AMBIEN) 5 MG tablet Take 1 tablet (5 mg total) by mouth at bedtime as needed for sleep. 30 tablet 0     No current facility-administered medications for this visit.      No past medical history on file.  Past Surgical History:   Procedure Laterality Date     [place] APPENDECTOMY      Description: Appendectomy;  Recorded: 08/30/2012;     [place] REMOVAL OF SPERM DUCT(S)      Description: Surgery Of Male Genitalia Vasectomy;  Recorded: 11/13/2013;     Patient has no known allergies.  Family History   Problem Relation Age of Onset     Alzheimer's disease Mother      Alzheimer's disease Father      Prostate cancer Father      Heart disease Father      Stroke Paternal Grandmother      Social History     Socioeconomic History     Marital status:      Spouse name: Not on file     Number of children: Not on file     Years of education: Not on file     Highest education level: Not on file  "  Occupational History     Not on file   Social Needs     Financial resource strain: Not on file     Food insecurity     Worry: Not on file     Inability: Not on file     Transportation needs     Medical: Not on file     Non-medical: Not on file   Tobacco Use     Smoking status: Former Smoker     Packs/day: 0.50     Types: Cigarettes     Smokeless tobacco: Never Used   Substance and Sexual Activity     Alcohol use: Not on file     Drug use: Not on file     Sexual activity: Not on file   Lifestyle     Physical activity     Days per week: Not on file     Minutes per session: Not on file     Stress: Not on file   Relationships     Social connections     Talks on phone: Not on file     Gets together: Not on file     Attends [employer] service: Not on file     Active member of club or organization: Not on file     Attends meetings of clubs or organizations: Not on file     Relationship status: Not on file     Intimate partner violence     Fear of current or ex partner: Not on file     Emotionally abused: Not on file     Physically abused: Not on file     Forced sexual activity: Not on file   Other Topics Concern     Not on file   Social History Narrative     Not on file       Review of Systems  General:  Denies problem  Eyes: Denies problem  Ears/Nose/Throat: Denies problem  Cardiovascular: Denies problem  Respiratory:  Denies problem  Gastrointestinal:  Denies problem  Genitourinary: Denies problem  Musculoskeletal:  Denies problem  Skin: Denies problem  Neurologic: Denies problem  Psychiatric: Denies problem  Endocrine: Denies problem  Heme/Lymphatic: Denies problem   Allergic/Immunologic: Denies problem        Objective:     Vitals:    02/01/21 0954   BP: 104/74   Pulse: 60   Weight: 174 lb (78.9 kg)   Height: 5' 7\" (1.702 m)     Body mass index is 27.25 kg/m .    Physical  General Appearance: Alert, cooperative, no distress, appears stated age  Head: Normocephalic, without obvious abnormality, atraumatic  Eyes: PERRL, " conjunctiva/corneas clear, EOM's intact  Ears: Normal TM's and external ear canals, both ears  Nose: Nares normal, septum midline,mucosa normal, no drainage  Throat: Lips, mucosa, and tongue normal; teeth and gums normal  Neck: Supple, symmetrical, trachea midline, no adenopathy;  thyroid: not enlarged, symmetric, no tenderness/mass/nodules; no carotid bruit or JVD  Back: Symmetric, no curvature, ROM normal, no CVA tenderness  Lungs: Clear to auscultation bilaterally, respirations unlabored  Heart: Regular rate and rhythm, S1 and S2 normal, no murmur, rub, or gallop,  Abdomen: Soft, non-tender, bowel sounds active all four quadrants,  no masses, no organomegaly  Genitourinary: Penis normal. Right testis is descended. Left testis is descended.      Extremities: Extremities normal, atraumatic, no cyanosis or edema  Skin: Skin color, texture, turgor normal, no rashes or lesions  Lymph nodes: Cervical, supraclavicular, and axillary nodes normal  Neurologic: He is alert. He has normal reflexes.   Psychiatric: He has a normal mood and affect.

## 2021-06-15 NOTE — PROGRESS NOTES
[facility] Discharge Summary  Patient Name: Brayden Peres  Date: 4/4/2018  Referral Diagnosis: Patellofemoral pain syndrome of right knee  Referring provider: Hernandez Gray, *  Visit Diagnosis:   1. Patellofemoral arthralgia of right knee     2. Decreased strength         Goals: see below.    Patient was seen for 4 visits physical therapy.    The patient attended therapy initially, but was referred back to PCP to follow up secondary to plateau of progress. Goals were not fully achieved. No follow up with PCP is noted in the chart, and the patient discontinued therapy, did not return.    Therapy will be discontinued at this time.  Please see progress note dated 2/1/18 for patient status.      Thank you for your referral.  Brayden TAE Rajiv, PT  4/4/2018  4:37 PM       [facility] Daily Progress     Patient Name: Brayden Peres  Date: 2/1/2018  Visit #: 4/6  Referral Diagnosis:  Patellofemoral pain syndrome   Referring provider: Hernandez Gray, *  Visit Diagnosis:     ICD-10-CM    1. Patellofemoral arthralgia of right knee M25.561    2. Decreased strength R53.1        Precautions / Restrictions : none     Assessment:       Patient has had some improvement of symptoms with  response to PT.  He has had some decreased pain and he has been able to increase his activity, but he has not had resolution of the pain and progress has stalled.  I recommend follow up with his referring provider regarding progress and other possible treatment options.    Symptoms are consistent with:  Patellofemoral pain syndrome.  Patient is appropriate to continue with skilled physical therapy intervention, as indicated by initial plan of care.    Goal Status:  Pt. will demonstrate/verbalize independence in self-management of condition in : 4 weeks  Pt. will be able to walk : 30 minutes;with no pain;for community mobility;for exercise/recreation;in 4 weeks  Patient will ascend / descend:  "stairs;curb;with no pain;in 4 weeks  No Data Recorded  Other functional progress:          Plan / Patient Education:     F/u with referring provider regarding plateau of progress.    Subjective:     Pain Rating:  Resting 3  Activity:  6    Response to last treatment: OK  HEP- Frequency: 2-4x/day, Questions or difficulties:  Some discomfort with quad stretch    Patient reports:      No improvement since last time at PT.    Walking with some pain.    Stairs are still painful dependent on increased activity.    Flare up last week with increased standing for chores around the house.      Objective:           Manual Therapy  MFR layers 1-3 right: quad, hamstring    Manual therapy:  right patella medial mobilization    Rate/grade Target  Direction  Relative movement Location in range Patient position   2,3 Lateral border of patella Medial   Medial glide of patella on the femoral groove. Full knee extension  Supine        Manual therapy:  right patella superior mobilization    Rate/grade Target  Direction  Relative movement Location in range Patient position   2,3 Inferior border of patella Superior   Superior glide of patella in the femoral groove. Full knee extension  Supine        Manual therapy:  right patella inferior mobilization for knee flexion    Rate/grade Target  Direction  Relative movement Location in range Patient position   2,3 Superior pole of patella Inferior    Inferior glide of patella in the femoral groove. Varying degrees of knee flexion from full extension to end-range flexion. Supine                Exercises below represent all exercise the patient has done in the clinic and HEP.  Please see today's exercise flow-sheet for specifics of today's exercise performance.     Exercises:  Exercise #1: gastroc stretch  Comment #1: 30\" x 2  Exercise #2: hamstring stretch   Comment #2: verbal review  Exercise #3: quad stretch  Comment #3: 30\" x 2 switched to prone with sheet to avoid aggravation of medial facet " "joint  Exercise #4: sidelying hip abduction cuing for decreased ROM and trunk/hip rotation forward to isolate glut  Comment #4: verbal review  Exercise #5: sidelying clamshells  reinstruction.  Comment #5: verbal review  Exercise #6: bridging  Comment #6: 10  Exercise #7: SLS-  cuing for upper body posture  Comment #7: x 3 use of mirror for feedback  Exercise #8: standing  and sidelying lateral hip/TFL stretch  Comment #8: 30\"  each  Exercise #9: squats  Comment #9: 10  Exercise #10: quad set  Comment #10: 10 with tactile cuing and facilitation of vastus medialis  Exercise #11: patient encouraged to increase activity jogging, CC skiing,    Instruction and trial of self mobilizations of right patella.    Treatment Today    TREATMENT MINUTES COMMENTS   Evaluation     Self-care/ Home management     Manual therapy 25 See above.   Neuromuscular Re-education     Therapeutic Activity     Therapeutic Exercises     Gait training     Modality__________________                Total 25    Blank areas are intentional and mean the treatment did not include these items.       Brayden Vance, PT  2/1/2018     "

## 2021-06-15 NOTE — PROGRESS NOTES
[facility] Daily Progress     Patient Name: Brayden Peres  Date: 1/4/2018  Visit #: 2/6  Referral Diagnosis:  Patellofemoral pain syndrome   Referring provider: Hernandez Gray, *  Visit Diagnosis:     ICD-10-CM    1. Patellofemoral arthralgia of right knee M25.561    2. Decreased strength R53.1        Precautions / Restrictions : none     Assessment:     Response to Intervention:  Good tissue changes with manual therapy with decreased pain post treatment.  Good response to cuing for exercise technique.    Symptoms are consistent with:  Patellofemoral pain syndrome.  Patient is appropriate to continue with skilled physical therapy intervention, as indicated by initial plan of care.    Goal Status:  Pt. will demonstrate/verbalize independence in self-management of condition in : 4 weeks  Pt. will be able to walk : 30 minutes;with no pain;for community mobility;for exercise/recreation;in 4 weeks  Patient will ascend / descend: stairs;curb;with no pain;in 4 weeks  No Data Recorded  Other functional progress:          Plan / Patient Education:     Continue with initial plan of care.  Progress with home program as tolerated.  Continue manual therapy.    Subjective:     Pain Rating:  Resting 0  Activity:  4    Response to last treatment: good for an hour, then started to fel it again.  HEP- Frequency: 2-4x/day, Questions or difficulties:  Not getting any easier.    Patient reports:      Slow improvement noted.    Less intensity of pain.      Objective:       Less lateral tilting of patella compared to previous visit.    Manual Therapy  MFR layers 1-3 right: quad, hamstring    Manual therapy:  right patella medial mobilization    Rate/grade Target  Direction  Relative movement Location in range Patient position   2,3 Lateral border of patella Medial   Medial glide of patella on the femoral groove. Full knee extension  Supine        Manual therapy:  right patella superior mobilization    Rate/grade  "Target  Direction  Relative movement Location in range Patient position   2,3 Inferior border of patella Superior   Superior glide of patella in the femoral groove. Full knee extension  Supine        Manual therapy:  right patella inferior mobilization for knee flexion    Rate/grade Target  Direction  Relative movement Location in range Patient position   2,3 Superior pole of patella Inferior    Inferior glide of patella in the femoral groove. Varying degrees of knee flexion from full extension to end-range flexion. Supine                Exercises below represent all exercise the patient has done in the clinic and HEP.  Please see today's exercise flow-sheet for specifics of today's exercise performance.     Exercises:  Exercise #1: gastroc stretch  Comment #1: 30\" x 2  Exercise #2: hamstring stretch   Comment #2: 30\" x 2   Exercise #3: quad stretch  Comment #3: 30\" x 2   Exercise #4: sidelying hip abduction  Comment #4: 10  Exercise #5: sidelying clamshells  Comment #5: 10  Exercise #6: bridging  Comment #6: 10  Exercise #7: SLS  Comment #7: instruction for eyes open and eyes closed.  Exercise #8: verbal instruction and review for all exercises in HEP, all questions answered.   Instruction and trial of self mobilizations of right patella.    Treatment Today    TREATMENT MINUTES COMMENTS   Evaluation     Self-care/ Home management     Manual therapy 15 See above.   Neuromuscular Re-education     Therapeutic Activity     Therapeutic Exercises 15 See exercise flow-sheet for details.    Gait training     Modality__________________                Total 30    Blank areas are intentional and mean the treatment did not include these items.       Brayden Vance, PT  1/4/2018     "

## 2021-06-15 NOTE — PROGRESS NOTES
[facility] Daily Progress     Patient Name: Brayden Peres  Date: 1/18/2018  Visit #: 3/6  Referral Diagnosis:  Patellofemoral pain syndrome   Referring provider: Hernandez Gray, *  Visit Diagnosis:     ICD-10-CM    1. Patellofemoral arthralgia of right knee M25.561    2. Decreased strength R53.1        Precautions / Restrictions : none     Assessment:     Response to Intervention:  Good overall response to PT.    Symptoms are consistent with:  Patellofemoral pain syndrome.  Patient is appropriate to continue with skilled physical therapy intervention, as indicated by initial plan of care.    Goal Status:  Pt. will demonstrate/verbalize independence in self-management of condition in : 4 weeks  Pt. will be able to walk : 30 minutes;with no pain;for community mobility;for exercise/recreation;in 4 weeks  Patient will ascend / descend: stairs;curb;with no pain;in 4 weeks  No Data Recorded  Other functional progress:          Plan / Patient Education:     Trial of independent self-management of condition initiated.  Patient to contact PT by phone or schedule an appointment as needed if symptoms increase or progress stops.  If patient has not returned to continue therapy in 30 days then physical therapy will be discharged.      Subjective:     Pain Rating:  Resting 0  Activity:  4    Response to last treatment: good for an hour, then started to fel it again.  HEP- Frequency: 2-4x/day, Questions or difficulties:  Some discomfort with quad stretch    Patient reports:      Incremental improvement noted.    Less intensity of pain.      Objective:       Less lateral tilting of patella compared to previous visit.    Manual Therapy  MFR layers 1-3 right: quad, hamstring    Manual therapy:  right patella medial mobilization    Rate/grade Target  Direction  Relative movement Location in range Patient position   2,3 Lateral border of patella Medial   Medial glide of patella on the femoral groove. Full knee  "extension  Supine        Manual therapy:  right patella superior mobilization    Rate/grade Target  Direction  Relative movement Location in range Patient position   2,3 Inferior border of patella Superior   Superior glide of patella in the femoral groove. Full knee extension  Supine        Manual therapy:  right patella inferior mobilization for knee flexion    Rate/grade Target  Direction  Relative movement Location in range Patient position   2,3 Superior pole of patella Inferior    Inferior glide of patella in the femoral groove. Varying degrees of knee flexion from full extension to end-range flexion. Supine                Exercises below represent all exercise the patient has done in the clinic and HEP.  Please see today's exercise flow-sheet for specifics of today's exercise performance.     Exercises:  Exercise #1: gastroc stretch  Comment #1: 30\" x 2  Exercise #2: hamstring stretch   Comment #2: verbal review  Exercise #3: quad stretch  Comment #3: 30\" x 2 switched to prone with sheet to avoid aggravation of medial facet joint  Exercise #4: sidelying hip abduction cuing for decreased ROM and trunk/hip rotation forward to isolate glut  Comment #4: verbal review  Exercise #5: sidelying clamshells  reinstruction.  Comment #5: verbal review  Exercise #6: bridging  Comment #6: 10  Exercise #7: SLS-  cuing for upper body posture  Comment #7: x 3 use of mirror for feedback  Exercise #8: standing  and sidelying lateral hip/TFL stretch  Comment #8: 30\"  each  Exercise #9: squats  Comment #9: 10  Exercise #10: quad set  Comment #10: 10 with tactile cuing and facilitation of vastus medialis  Exercise #11: patient encouraged to increase activity jogging, CC skiing,    Instruction and trial of self mobilizations of right patella.    Treatment Today    TREATMENT MINUTES COMMENTS   Evaluation     Self-care/ Home management     Manual therapy 20 See above.   Neuromuscular Re-education     Therapeutic Activity   "   Therapeutic Exercises 10 See exercise flow-sheet for details.    Gait training     Modality__________________                Total 30    Blank areas are intentional and mean the treatment did not include these items.       Brayden Vance, PT  1/18/2018

## 2021-06-17 NOTE — TELEPHONE ENCOUNTER
Controlled Substance Refill Request  Medication Name:   Requested Prescriptions     Pending Prescriptions Disp Refills     zolpidem (AMBIEN) 5 MG tablet 30 tablet 0     Sig: Take 1 tablet (5 mg total) by mouth at bedtime as needed for sleep.     Date Last Fill: 2/1/2021  [facility]ed [facility]: [facility]  Submit electronically to pharmacy  Controlled Substance Agreement on file:   Encounter-Level CSA Scan Date:    There are no encounter-level csa scan date.        Last office visit:  2/1/2021

## 2021-06-22 NOTE — PROGRESS NOTES
Clinic Note    Assessment:     Assessment and Plan:  1. Bruised sole of foot, left, initial encounter  He was struck by a hockey puck a couple of days ago.  He has some bruising but no evidence of fracture.  We will hold off on getting x-rays today.    2. Lateral epicondylitis of right elbow  We discussed conservative measures with using a brace and NSAIDs.  He is going to follow-up with Dr. Hernandez Gray as needed.       Patient Instructions   Your foot does not appear broken.  It seems as though you sustained a rather significant bone bruise.  This should improve over the next week to 10 days.  If you do not notice any significant improvement by that time, come back to see me and we can get an x-ray of your foot.    Think you are dealing with some lateral epicondylitis (tennis elbow) this usually clears up on its own with conservative measures.  I recommend that you buy a brace from the pharmacy and to wear it during the day.  You can take it off at night.  You should start to notice some improvement over the next few weeks.  If you do not, schedule appointment with Dr. Hernandez Gray.    Return for If symptoms do not start to improve in one month..         Subjective:      Patient comes to clinic today for multiple issues.  He is having pain in his right elbow, and pain in his left foot.    Right elbow: Patient was moving some heavy furniture over this past weekend when he started developing some pain in his right elbow and right forearm.  He thinks he may have sprained something in his arm.  Pain seems to have gotten slightly worse since the incident.  He has not tried any over-the-counter medications other than ibuprofen.  No ice.  No heat.  He does not have a brace.  No history of lateral epicondylitis.    Left foot: Patient was playing hockey a day or 2 ago when he was struck by a puck on the inside of his left foot near his great toe.  He developed some swelling and tenderness immediately afterward.  Hurts with  ambulation.  He is able to flex and extend his toes without issue.  He is not walk with a limp.  Is wondering if he needs an x-ray today.    The following portions of the patient's history were reviewed and updated as appropriate: Allergies, medications, problem list, prior note.     Review of Systems:    Review is otherwise negative except for what is mentioned above.     Social Hx:    Social History     Tobacco Use   Smoking Status Former Smoker     Packs/day: 0.50     Types: Cigarettes   Smokeless Tobacco Never Used         Objective:     Vitals:    12/11/18 1459   BP: 118/70   Pulse: 62   Weight: 170 lb 6.4 oz (77.3 kg)       Exam:    General: No apparent distress. Calm. Alert and Oriented X3. Pt behavior is appropriate.  Musculoskeletal: There is some swelling and discoloration along the medial aspect of the patient's left foot.  First MTP joint has some mild swelling as well.  Toe range of motion intact.  No pain with palpation to the metatarsals.  Right elbow/forearm-pain with assistance to flexion and extension of wrist.  Slight tenderness with palpation to lateral epicondyle.        Patient Active Problem List   Diagnosis     Needs smoking cessation education     Current Outpatient Medications   Medication Sig Dispense Refill     MULTIVITAMIN ORAL Take by mouth.       zolpidem (AMBIEN) 5 MG tablet Take 1 tablet (5 mg total) by mouth at bedtime as needed for sleep. 30 tablet 0     No current facility-administered medications for this visit.        I spent 25 minutes with patient face to face, of which >50% was counseling regarding the above plan       Elliot Edmond CNP (Rob)    12/11/2018

## 2021-06-24 NOTE — TELEPHONE ENCOUNTER
Controlled Substance Refill Request  Medication:   Requested Prescriptions     Pending Prescriptions Disp Refills     zolpidem (AMBIEN) 5 MG tablet 30 tablet 0     Sig: Take 1 tablet (5 mg total) by mouth at bedtime as needed for sleep.     Date Last Fill: 11/26/18  [facility]acy: [facility]   Submit electronically to pharmacy    Controlled Substance Agreement on File:   Encounter-Level CSA Scan Date:    There are no encounter-level csa scan date.       Last office visit with primary: 12/19/2017

## 2021-07-26 DIAGNOSIS — G47.00 INSOMNIA, UNSPECIFIED TYPE: ICD-10-CM

## 2021-07-29 NOTE — TELEPHONE ENCOUNTER
Routing refill request to provider for review/approval because:  Controlled medication refill request.  Routing to provider's care team.  ___________________________________________________________    Copy of Last Rx:        Last office visit with provider:  2/1/21   ___________________________________________________________      Requested Prescriptions   Pending Prescriptions Disp Refills     zolpidem (AMBIEN) 5 MG tablet [Pharmacy Med Name: ZOLPIDEM 5MG TABLETS] 30 tablet      Sig: TAKE 1 TABLET(5 MG) BY MOUTH AT BEDTIME AS NEEDED FOR SLEEP       There is no refill protocol information for this order            Emilee Cardona RN 07/29/21 5:27 PM

## 2021-07-30 RX ORDER — ZOLPIDEM TARTRATE 5 MG/1
TABLET ORAL
Qty: 30 TABLET | Refills: 1 | Status: SHIPPED | OUTPATIENT
Start: 2021-07-30 | End: 2021-09-16

## 2021-09-16 DIAGNOSIS — G47.00 INSOMNIA, UNSPECIFIED TYPE: ICD-10-CM

## 2021-09-16 RX ORDER — ZOLPIDEM TARTRATE 5 MG/1
TABLET ORAL
Qty: 30 TABLET | Refills: 1 | Status: SHIPPED | OUTPATIENT
Start: 2021-09-16 | End: 2022-02-24

## 2021-09-16 NOTE — TELEPHONE ENCOUNTER
..Reason for Call:  Medication or medication refill:    Do you use a [facility]?  Name of the pharmacy and phone number for the current request:  [id number] - [place] [address] AT [facility] [address]  [phone]    Name of the medication requested: zolpidem (AMBIEN) 5 MG tablet    Other request: NONE     Can we leave a detailed message on this number? YES    Phone number patient can be reached at: Home number on file [phone] (home)    Best Time: any    Call taken on 9/16/2021 at 10:10 AM by JIM Diana

## 2021-10-11 ENCOUNTER — HEALTH MAINTENANCE LETTER (OUTPATIENT)
Age: 46
End: 2021-10-11

## 2021-12-09 ENCOUNTER — TRANSFERRED RECORDS (OUTPATIENT)
Dept: HEALTH INFORMATION MANAGEMENT | Facility: CLINIC | Age: 46
End: 2021-12-09
Payer: COMMERCIAL

## 2022-02-18 DIAGNOSIS — G47.00 INSOMNIA, UNSPECIFIED TYPE: ICD-10-CM

## 2022-02-18 NOTE — TELEPHONE ENCOUNTER
Reason for Call:  Medication or medication refill:   zolpidem (AMBIEN) 5 MG tablet    Do you use a [facility]? NO  Name of the pharmacy and phone number for the current request:  [facility] [address] @ [facility] [address] in [place], [phone]     Name of the medication requested:     zolpidem (AMBIEN) 5 MG tablet 30 tablet 1 9/16/2021  No   Sig: TAKE 1 TABLET(5 MG) BY MOUTH AT BEDTIME AS NEEDED FOR SLEEP     Can we leave a detailed message on this number? YES    Phone number patient can be reached at: Home number on file [phone] (home)    Best Time: ANY    Call taken on 2/18/2022 at 3:51 PM by Sara Rogers

## 2022-02-18 NOTE — TELEPHONE ENCOUNTER
Medication:   Disp Refills Start End YADIRA   zolpidem (AMBIEN) 5 MG tablet 30 tablet 1 9/16/2021  No   Sig: TAKE 1 TABLET(5 MG) BY MOUTH AT BEDTIME AS NEEDED FOR SLEEP       Pharmacy:  [facility] #[id number] - [place] [address] AT [facility] [address]  [phone]    Last OV:  2/01/2021

## 2022-02-24 RX ORDER — ZOLPIDEM TARTRATE 5 MG/1
TABLET ORAL
Qty: 30 TABLET | Refills: 1 | Status: SHIPPED | OUTPATIENT
Start: 2022-02-24 | End: 2022-08-11

## 2022-03-04 ENCOUNTER — OFFICE VISIT (OUTPATIENT)
Dept: INTERNAL MEDICINE | Facility: CLINIC | Age: 47
End: 2022-03-04
Payer: COMMERCIAL

## 2022-03-04 VITALS
DIASTOLIC BLOOD PRESSURE: 80 MMHG | HEIGHT: 68 IN | OXYGEN SATURATION: 97 % | WEIGHT: 183.9 LBS | BODY MASS INDEX: 27.87 KG/M2 | SYSTOLIC BLOOD PRESSURE: 116 MMHG | HEART RATE: 69 BPM

## 2022-03-04 DIAGNOSIS — Z80.42 FAMILY HISTORY OF PROSTATE CANCER: ICD-10-CM

## 2022-03-04 DIAGNOSIS — Z00.00 ENCOUNTER FOR ROUTINE ADULT MEDICAL EXAMINATION: Primary | ICD-10-CM

## 2022-03-04 DIAGNOSIS — F51.04 CHRONIC INSOMNIA: ICD-10-CM

## 2022-03-04 LAB
CHOLEST SERPL-MCNC: 233 MG/DL
FASTING STATUS PATIENT QL REPORTED: YES
FASTING STATUS PATIENT QL REPORTED: YES
GLUCOSE BLD-MCNC: 80 MG/DL (ref 70–125)
HDLC SERPL-MCNC: 54 MG/DL
LDLC SERPL CALC-MCNC: 130 MG/DL
PSA SERPL-MCNC: 1.01 UG/L (ref 0–2.5)
TRIGL SERPL-MCNC: 245 MG/DL

## 2022-03-04 PROCEDURE — 36415 COLL VENOUS BLD VENIPUNCTURE: CPT | Performed by: INTERNAL MEDICINE

## 2022-03-04 PROCEDURE — 99396 PREV VISIT EST AGE 40-64: CPT | Performed by: INTERNAL MEDICINE

## 2022-03-04 PROCEDURE — 80061 LIPID PANEL: CPT | Performed by: INTERNAL MEDICINE

## 2022-03-04 PROCEDURE — 82947 ASSAY GLUCOSE BLOOD QUANT: CPT | Performed by: INTERNAL MEDICINE

## 2022-03-04 PROCEDURE — G0103 PSA SCREENING: HCPCS | Performed by: INTERNAL MEDICINE

## 2022-03-04 NOTE — PROGRESS NOTES
"SUBJECTIVE:   CC: Brayden Peres is an 46 year old male who presents for preventative health visit.     Brayden comes in today for his yearly physical.  He is feeling well today and has no acute complaints.  He uses Ambien nightly for sleep.  Family history of prostate cancer and is due for PSA.  Otherwise no changes in past medical or surgical history.      Patient has been advised of split billing requirements and indicates understanding: Yes  Healthy Habits:   PHQ-2 Total Score: 1        Today's PHQ-2 Score:   PHQ-2 ( 1999 [employer]) 3/4/2022   Q1: Little interest or pleasure in doing things 1   Q2: Feeling down, depressed or hopeless 0   PHQ-2 Score 1   Q1: Little interest or pleasure in doing things Several days   Q2: Feeling down, depressed or hopeless Not at all   PHQ-2 Score 1       Abuse: Current or Past(Physical, Sexual or Emotional)- No  Do you feel safe in your environment? Yes    Have you ever done Advance Care Planning? (For example, a Health Directive, POLST, or a discussion with a medical provider or your loved ones about your wishes): No, advance care planning information given to patient to review.  Patient plans to discuss their wishes with loved ones or provider.      Social History     Tobacco Use     Smoking status: Former Smoker     Packs/day: 0.50     Types: Cigarettes     Smokeless tobacco: Never Used   Substance Use Topics     Alcohol use: Not on file     If you drink alcohol do you typically have >3 drinks per day or >7 drinks per week? Yes      Alcohol Use 3/4/2022   Prescreen: >3 drinks/day or >7 drinks/week? Yes   AUDIT SCORE  6       Last PSA:   Prostate Specific Antigen Screen   Date Value Ref Range Status   01/03/2020 1.1 0.0 - 2.5 ng/mL Final         OBJECTIVE:   /80   Pulse 69   Ht 1.715 m (5' 7.5\")   Wt 83.4 kg (183 lb 14.4 oz)   SpO2 97%   BMI 28.38 kg/m      Physical Exam  GENERAL: healthy, alert and no distress  EYES: Eyes grossly normal to inspection, PERRL and " "conjunctivae and sclerae normal  HENT: ear canals and TM's normal, nose and mouth without ulcers or lesions  NECK: no adenopathy, no asymmetry, masses, or scars and thyroid normal to palpation  RESP: lungs clear to auscultation - no rales, rhonchi or wheezes  CV: regular rate and rhythm, normal S1 S2, no S3 or S4, no murmur, click or rub, no peripheral edema and peripheral pulses strong  ABDOMEN: soft, nontender, no hepatosplenomegaly, no masses and bowel sounds normal  MS: no gross musculoskeletal defects noted, no edema  SKIN: no suspicious lesions or rashes  NEURO: Normal strength and tone, mentation intact and speech normal  PSYCH: mentation appears normal, affect normal/bright    Diagnostic Test Results:  Labs reviewed in Epic    ASSESSMENT/PLAN:   (Z00.00) Encounter for routine adult medical examination  (primary encounter diagnosis)  Comment: Colon cancer screening will be due as he is over the age of 45.  Check labs as below.  Check a PSA given his family history as well.  Vaccinations are up-to-date.  Follow-up 1 year, earlier if needed.  Plan: Adult Gastro Ref - Procedure Only, Lipid         Profile (Chol, Trig, HDL, LDL calc), Glucose            (F51.04) Chronic insomnia  Comment: Continue Ambien.  Plan:     (Z80.42) Family history of prostate cancer  Comment: Check a PSA.  Plan: PSA, screen          Estimated body mass index is 28.38 kg/m  as calculated from the following:    Height as of this encounter: 1.715 m (5' 7.5\").    Weight as of this encounter: 83.4 kg (183 lb 14.4 oz).         He reports that he has quit smoking. His smoking use included cigarettes. He smoked 0.50 packs per day. He has never used smokeless tobacco.      Counseling Resources:  ATP IV Guidelines  Pooled Cohorts Equation Calculator  FRAX Risk Assessment  ICSI Preventive Guidelines  Dietary Guidelines for Americans, 2010  USDA's MyPlate  ASA Prophylaxis  Lung CA Screening    Hernandez Gray MD  [facility] " [facility]

## 2022-04-21 ENCOUNTER — TRANSFERRED RECORDS (OUTPATIENT)
Dept: HEALTH INFORMATION MANAGEMENT | Facility: CLINIC | Age: 47
End: 2022-04-21
Payer: COMMERCIAL

## 2022-08-11 DIAGNOSIS — G47.00 INSOMNIA, UNSPECIFIED TYPE: ICD-10-CM

## 2022-08-11 RX ORDER — ZOLPIDEM TARTRATE 5 MG/1
TABLET ORAL
Qty: 30 TABLET | Refills: 1 | Status: SHIPPED | OUTPATIENT
Start: 2022-08-11 | End: 2023-01-12

## 2022-09-25 ENCOUNTER — HEALTH MAINTENANCE LETTER (OUTPATIENT)
Age: 47
End: 2022-09-25

## 2023-01-12 DIAGNOSIS — G47.00 INSOMNIA, UNSPECIFIED TYPE: ICD-10-CM

## 2023-01-12 RX ORDER — ZOLPIDEM TARTRATE 5 MG/1
TABLET ORAL
Qty: 30 TABLET | Refills: 0 | Status: SHIPPED | OUTPATIENT
Start: 2023-01-12 | End: 2023-03-13

## 2023-05-04 ENCOUNTER — MYC REFILL (OUTPATIENT)
Dept: INTERNAL MEDICINE | Facility: CLINIC | Age: 48
End: 2023-05-04
Payer: COMMERCIAL

## 2023-05-04 DIAGNOSIS — G47.00 INSOMNIA, UNSPECIFIED TYPE: ICD-10-CM

## 2023-05-05 RX ORDER — ZOLPIDEM TARTRATE 5 MG/1
TABLET ORAL
Qty: 30 TABLET | Refills: 0 | Status: SHIPPED | OUTPATIENT
Start: 2023-05-05 | End: 2023-06-15

## 2023-05-13 ENCOUNTER — HEALTH MAINTENANCE LETTER (OUTPATIENT)
Age: 48
End: 2023-05-13

## 2023-06-06 ENCOUNTER — ANCILLARY PROCEDURE (OUTPATIENT)
Dept: GENERAL RADIOLOGY | Facility: CLINIC | Age: 48
End: 2023-06-06
Attending: NURSE PRACTITIONER
Payer: COMMERCIAL

## 2023-06-06 ENCOUNTER — OFFICE VISIT (OUTPATIENT)
Dept: INTERNAL MEDICINE | Facility: CLINIC | Age: 48
End: 2023-06-06
Payer: COMMERCIAL

## 2023-06-06 VITALS
DIASTOLIC BLOOD PRESSURE: 66 MMHG | OXYGEN SATURATION: 93 % | HEIGHT: 68 IN | TEMPERATURE: 98.7 F | SYSTOLIC BLOOD PRESSURE: 100 MMHG | HEART RATE: 76 BPM | BODY MASS INDEX: 27.89 KG/M2 | WEIGHT: 184 LBS | RESPIRATION RATE: 16 BRPM

## 2023-06-06 DIAGNOSIS — F51.04 CHRONIC INSOMNIA: ICD-10-CM

## 2023-06-06 DIAGNOSIS — M25.551 HIP PAIN, RIGHT: ICD-10-CM

## 2023-06-06 DIAGNOSIS — M25.551 HIP PAIN, RIGHT: Primary | ICD-10-CM

## 2023-06-06 PROCEDURE — 73502 X-RAY EXAM HIP UNI 2-3 VIEWS: CPT | Mod: TC | Performed by: RADIOLOGY

## 2023-06-06 PROCEDURE — 99213 OFFICE O/P EST LOW 20 MIN: CPT | Performed by: NURSE PRACTITIONER

## 2023-06-06 ASSESSMENT — ENCOUNTER SYMPTOMS: HIP PAIN: 1

## 2023-06-06 NOTE — PROGRESS NOTES
Assessment & Plan     Hip pain, right  10 days ago he was mountain biking when he slipped and fell directly onto his right hip.  Since that time, he has had persistent swelling, bruising, and pain.  Over the last 3 days, the pain seems like its been getting a bit worse.    He became concerned when he started noticing some numbness and tingling in his right hip area.    However, he denies any saddle anesthesia, loss of bowel or bladder function, foot drop, etc.    We will start with x-rays today.  He has a follow-up appointment with me set up in about 10 days.    - XR Hip Right 2-3 Views; Future    Chronic insomnia  Insomnia is controlled with Ambien.    Patient Instructions   X-ray of the right hip today to check basic anatomical structures.    Continue trending your symptoms and follow-up with me as scheduled.  If symptoms continue to be significant at that time, we may need to consider getting an MRI.    Continue regular physical activities but avoid aggravating motions.  You could try heat.  Gentle stretching.    Blood pressure and other vital signs look good today.    No changes in medications.        Elliot Edmond [facility]    Payton Owen is a 47 year old, presenting for the following health issues:    Hip Pain (Fell on mountain bike~1.5 weeks ago, having right hip pain and bruising, numbness, burning, shooting pain into buttock, swelling)        6/6/2023     7:10 AM   Additional Questions   Roomed by Nancie [facility]     Hip Pain    History of Present Illness       Reason for visit:  Hip  Symptom onset:  1-2 weeks ago    He eats 4 or more servings of fruits and vegetables daily.He exercises with enough effort to increase his heart rate 30 to 60 minutes per day.  He exercises with enough effort to increase his heart rate 7 days per week.   He is taking medications regularly.           Review of Systems   Constitutional, HEENT, cardiovascular, pulmonary, gi and gu  "systems are negative, except as otherwise noted.      Objective    /66 (BP Location: Right arm, Patient Position: Sitting, Cuff Size: Adult Large)   Pulse 76   Temp 98.7  F (37.1  C)   Resp 16   Ht 1.715 m (5' 7.5\")   Wt 83.5 kg (184 lb)   SpO2 93%   BMI 28.39 kg/m    Body mass index is 28.39 kg/m .  Physical Exam     Ecchymosis in the right hip area extending into the right lower abdominal quadrant.  No abdominal pain with palpation.  He is able to rise from a seated position and articulate throughout the room without difficulty.  No CVA tenderness with palpation.  No thoracic or lumbar pain with palpation to the spinal processes      "

## 2023-06-06 NOTE — PATIENT INSTRUCTIONS
X-ray of the right hip today to check basic anatomical structures.    Continue trending your symptoms and follow-up with me as scheduled.  If symptoms continue to be significant at that time, we may need to consider getting an MRI.    Continue regular physical activities but avoid aggravating motions.  You could try heat.  Gentle stretching.    Blood pressure and other vital signs look good today.    No changes in medications.

## 2023-06-15 ENCOUNTER — OFFICE VISIT (OUTPATIENT)
Dept: INTERNAL MEDICINE | Facility: CLINIC | Age: 48
End: 2023-06-15
Payer: COMMERCIAL

## 2023-06-15 VITALS
RESPIRATION RATE: 16 BRPM | DIASTOLIC BLOOD PRESSURE: 74 MMHG | BODY MASS INDEX: 29.03 KG/M2 | SYSTOLIC BLOOD PRESSURE: 122 MMHG | WEIGHT: 185 LBS | OXYGEN SATURATION: 98 % | HEART RATE: 82 BPM | TEMPERATURE: 98.7 F | HEIGHT: 67 IN

## 2023-06-15 DIAGNOSIS — G47.00 INSOMNIA, UNSPECIFIED TYPE: ICD-10-CM

## 2023-06-15 DIAGNOSIS — M25.551 HIP PAIN, RIGHT: ICD-10-CM

## 2023-06-15 DIAGNOSIS — E66.3 OVERWEIGHT (BMI 25.0-29.9): ICD-10-CM

## 2023-06-15 DIAGNOSIS — Z82.49 FAMILY HISTORY OF CORONARY ARTERIOSCLEROSIS: ICD-10-CM

## 2023-06-15 DIAGNOSIS — Z80.42 FAMILY HISTORY OF PROSTATE CANCER: ICD-10-CM

## 2023-06-15 DIAGNOSIS — F10.20 UNCOMPLICATED ALCOHOL DEPENDENCE (H): ICD-10-CM

## 2023-06-15 DIAGNOSIS — Z00.00 ANNUAL PHYSICAL EXAM: Primary | ICD-10-CM

## 2023-06-15 DIAGNOSIS — Z13.220 SCREENING FOR LIPOID DISORDERS: ICD-10-CM

## 2023-06-15 LAB
HBA1C MFR BLD: 5 % (ref 0–5.6)
LDLC SERPL DIRECT ASSAY-MCNC: 142 MG/DL
PSA SERPL DL<=0.01 NG/ML-MCNC: 1.36 NG/ML (ref 0–2.5)

## 2023-06-15 PROCEDURE — 91313 COVID-19 BIVALENT 18+ (MODERNA): CPT | Performed by: NURSE PRACTITIONER

## 2023-06-15 PROCEDURE — 99396 PREV VISIT EST AGE 40-64: CPT | Mod: 25 | Performed by: NURSE PRACTITIONER

## 2023-06-15 PROCEDURE — 36415 COLL VENOUS BLD VENIPUNCTURE: CPT | Performed by: NURSE PRACTITIONER

## 2023-06-15 PROCEDURE — 83036 HEMOGLOBIN GLYCOSYLATED A1C: CPT | Performed by: NURSE PRACTITIONER

## 2023-06-15 PROCEDURE — 0134A COVID-19 BIVALENT 18+ (MODERNA): CPT | Performed by: NURSE PRACTITIONER

## 2023-06-15 PROCEDURE — G0103 PSA SCREENING: HCPCS | Performed by: NURSE PRACTITIONER

## 2023-06-15 PROCEDURE — 83721 ASSAY OF BLOOD LIPOPROTEIN: CPT | Performed by: NURSE PRACTITIONER

## 2023-06-15 PROCEDURE — 99213 OFFICE O/P EST LOW 20 MIN: CPT | Mod: 25 | Performed by: NURSE PRACTITIONER

## 2023-06-15 RX ORDER — ZOLPIDEM TARTRATE 5 MG/1
TABLET ORAL
Qty: 30 TABLET | Refills: 0 | Status: SHIPPED | OUTPATIENT
Start: 2023-06-15 | End: 2023-10-03

## 2023-06-15 ASSESSMENT — ENCOUNTER SYMPTOMS
PALPITATIONS: 0
NAUSEA: 0
DYSURIA: 0
PARESTHESIAS: 0
DIZZINESS: 0
JOINT SWELLING: 0
FEVER: 0
ARTHRALGIAS: 0
HEMATURIA: 0
CHILLS: 0
CONSTIPATION: 0
NERVOUS/ANXIOUS: 0
COUGH: 0
DIARRHEA: 0
SORE THROAT: 0
HEADACHES: 0
SHORTNESS OF BREATH: 0
ABDOMINAL PAIN: 0
HEMATOCHEZIA: 0
WEAKNESS: 0
EYE PAIN: 0
HEARTBURN: 0
FREQUENCY: 0
MYALGIAS: 1

## 2023-06-15 NOTE — LETTER
[facility]  06/15/23  Patient: Brayden Peres  YOB: 1975  Medical Record Number: [id number]                                                                                  Non-Opioid Controlled Substance Agreement    This is an agreement between you and your provider regarding safe and appropriate use of controlled substances prescribed by your care team. Controlled substances are?medicines that can cause physical and mental dependence (abuse).     There are strict laws about having and using these medicines. We here at [facility] are  committed to working with you in your efforts to get better. To support you in this work, we'll help you schedule regular office appointments for medicine refills. If we must cancel or change your appointment for any reason, we'll make sure you have enough medicine to last until your next appointment.     As a Provider, I will:     Listen carefully to your concerns while treating you with respect.     Recommend a treatment plan that I believe is in your best interest and may involve therapies other than medicine.      Talk with you often about the possible benefits and the risk of harm of any medicine that we prescribe for you.    Assess the safety of this medicine and check how well it works.      Provide a plan on how to taper (discontinue or go off) using this medicine if the decision is made to stop its use.      ::  As a Patient, I understand controlled substances:       Are prescribed by my care provider to help me function or work and manage my condition(s).?    Are strong medicines and can cause serious side effects.       Need to be taken exactly as prescribed.?Combining controlled substances with certain medicines or chemicals (such as illegal drugs, alcohol, sedatives, sleeping pills, and benzodiazepines) can be dangerous or even fatal.? If I stop taking my medicines suddenly, I may have severe withdrawal symptoms.     The  risks, benefits, and side effects of these medicine(s) were explained to me. I agree that:    1. I will take part in other treatments as advised by my care team. This may be psychiatry or counseling, physical therapy, behavioral therapy, group treatment or a referral to specialist.    2. I will keep all my appointments and understand this is part of the monitoring of controlled substances.?My care team may require an office visit for EVERY controlled substance refill. If I miss appointments or don t follow instructions, my care team may stop my medicine    3. I will take my medicines as prescribed. I will not change the dose or schedule unless my care team tells me to. There will be no refills if I run out early.      4. I may be asked to come to the clinic and complete a urine drug test or complete a pill count. If I don t give a urine sample or participate in a pill count, the care team may stop my medicine.    5. I will only receive controlled substance prescriptions from this clinic. If I am treated by another provider, I will tell them that I am taking controlled substances and that I have a treatment agreement with this provider. I will inform my [facility] care team within one business day if I am given a prescription for any controlled substance by another healthcare provider. My [facility] care team can contact other providers and pharmacists about my use of any medicines.    6. It is up to me to make sure that I don't run out of my medicines on weekends or holidays.?If my care team is willing to refill my prescription without a visit, I must request refills only during office hours. Refills may take up to 3 business days to process. I will use one pharmacy to fill all my controlled substance prescriptions. I will notify the clinic about any changes to my insurance or medicine availability.    7. I am responsible for my prescriptions. If the medicine/prescription is lost, stolen or destroyed,  it will not be replaced.?I also agree not to share controlled substance medicines with anyone.     8. I am aware I should not use any illegal or recreational drugs. I agree not to drink alcohol unless my care team says I can.     9. If I enroll in the [place] Medical Cannabis program, I will tell my care team before my next refill.    10. I will tell my care team right away if I become pregnant, have a new medical problem treated outside of my regular clinic, or have a change in my medicines.     11. I understand that this medicine can affect my thinking, judgment and reaction time.? Alcohol and drugs affect the brain and body, which can affect the safety of my driving. Being under the influence of alcohol or drugs can affect my decision-making, behaviors, personal safety and the safety of others. Driving while impaired (DWI) can occur if a person is driving, operating or in physical control of a car, motorcycle, boat, snowmobile, ATV, motorbike, off-road vehicle or any other motor vehicle ([place] Statute 169A.20). I understand the risk if I choose to drive or operate any vehicle or machinery.    I understand that if I do not follow any of the conditions above, my prescriptions or treatment may be stopped or changed.   I agree that my provider, clinic care team and pharmacy may work with any city, state or federal law enforcement agency that investigates the misuse, sale or other diversion of my controlled medicine. I will allow my provider to discuss my care with, or share a copy of, this agreement with any other treating provider, pharmacy or emergency room where I receive care.     I have read this agreement and have asked questions about anything I did not understand.    ________________________________________________________  Patient Signature - Brayden Peres     ___________________                   Date     ________________________________________________________  Provider Signature - Elliot Edmond,  CNP       ___________________                   Date     ________________________________________________________  Witness Signature (required if provider not present while patient signing)          ___________________                   Date

## 2023-06-15 NOTE — PATIENT INSTRUCTIONS
Call [phone] to set up the CT coronary calcium scan.    Lab work today.  Results will come through [facility].    COVID booster today.    Try to cut down on the alcohol.    If your hip is still bothering you considerably in a few weeks, send me a [facility] message and we could consider getting an MRI at that time.
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
## 2023-07-24 ENCOUNTER — HOSPITAL ENCOUNTER (OUTPATIENT)
Dept: CT IMAGING | Facility: CLINIC | Age: 48
Discharge: HOME | End: 2023-07-24
Attending: NURSE PRACTITIONER | Admitting: NURSE PRACTITIONER
Payer: COMMERCIAL

## 2023-07-24 DIAGNOSIS — Z82.49 FAMILY HISTORY OF CORONARY ARTERIOSCLEROSIS: ICD-10-CM

## 2023-07-24 LAB
CV CALCIUM SCORE AGATSTON LM: 0
CV CALCIUM SCORING AGATSON LAD: 48
CV CALCIUM SCORING AGATSTON CX: 0
CV CALCIUM SCORING AGATSTON RCA: 0
CV CALCIUM SCORING AGATSTON TOTAL: 48

## 2023-07-24 PROCEDURE — 75571 CT HRT W/O DYE W/CA TEST: CPT | Mod: GA

## 2023-07-24 PROCEDURE — 75571 CT HRT W/O DYE W/CA TEST: CPT | Mod: 26 | Performed by: INTERNAL MEDICINE

## 2023-07-27 ENCOUNTER — MYC MEDICAL ADVICE (OUTPATIENT)
Dept: INTERNAL MEDICINE | Facility: CLINIC | Age: 48
End: 2023-07-27
Payer: COMMERCIAL

## 2023-07-27 DIAGNOSIS — Z82.49 FAMILY HISTORY OF CORONARY ARTERIOSCLEROSIS: Primary | ICD-10-CM

## 2023-07-28 RX ORDER — ATORVASTATIN CALCIUM 10 MG/1
10 TABLET, FILM COATED ORAL DAILY
Qty: 90 TABLET | Refills: 1 | Status: SHIPPED | OUTPATIENT
Start: 2023-07-28 | End: 2023-12-18

## 2023-10-03 ENCOUNTER — MYC REFILL (OUTPATIENT)
Dept: INTERNAL MEDICINE | Facility: CLINIC | Age: 48
End: 2023-10-03
Payer: COMMERCIAL

## 2023-10-03 DIAGNOSIS — G47.00 INSOMNIA, UNSPECIFIED TYPE: ICD-10-CM

## 2023-10-03 RX ORDER — ZOLPIDEM TARTRATE 5 MG/1
TABLET ORAL
Qty: 30 TABLET | Refills: 0 | Status: SHIPPED | OUTPATIENT
Start: 2023-10-03 | End: 2024-01-03

## 2023-12-18 ENCOUNTER — TELEPHONE (OUTPATIENT)
Dept: INTERNAL MEDICINE | Facility: CLINIC | Age: 48
End: 2023-12-18

## 2023-12-18 ENCOUNTER — OFFICE VISIT (OUTPATIENT)
Dept: INTERNAL MEDICINE | Facility: CLINIC | Age: 48
End: 2023-12-18
Payer: COMMERCIAL

## 2023-12-18 VITALS
OXYGEN SATURATION: 96 % | SYSTOLIC BLOOD PRESSURE: 120 MMHG | TEMPERATURE: 98.3 F | RESPIRATION RATE: 16 BRPM | HEIGHT: 67 IN | HEART RATE: 69 BPM | WEIGHT: 178 LBS | BODY MASS INDEX: 27.94 KG/M2 | DIASTOLIC BLOOD PRESSURE: 66 MMHG

## 2023-12-18 DIAGNOSIS — Z82.49 FAMILY HISTORY OF CORONARY ARTERIOSCLEROSIS: Primary | ICD-10-CM

## 2023-12-18 DIAGNOSIS — Z82.49 FAMILY HISTORY OF CORONARY ARTERIOSCLEROSIS: ICD-10-CM

## 2023-12-18 DIAGNOSIS — M72.2 PLANTAR FASCIITIS: ICD-10-CM

## 2023-12-18 DIAGNOSIS — F51.04 CHRONIC INSOMNIA: ICD-10-CM

## 2023-12-18 DIAGNOSIS — R93.1 ELEVATED CORONARY ARTERY CALCIUM SCORE: ICD-10-CM

## 2023-12-18 LAB
ALBUMIN SERPL BCG-MCNC: 4.5 G/DL (ref 3.5–5.2)
ALP SERPL-CCNC: 64 U/L (ref 40–150)
ALT SERPL W P-5'-P-CCNC: 33 U/L (ref 0–70)
AST SERPL W P-5'-P-CCNC: 28 U/L (ref 0–45)
BILIRUB DIRECT SERPL-MCNC: <0.2 MG/DL (ref 0–0.3)
BILIRUB SERPL-MCNC: 0.4 MG/DL
LDLC SERPL DIRECT ASSAY-MCNC: 103 MG/DL
PROT SERPL-MCNC: 7.2 G/DL (ref 6.4–8.3)

## 2023-12-18 PROCEDURE — 80076 HEPATIC FUNCTION PANEL: CPT | Performed by: NURSE PRACTITIONER

## 2023-12-18 PROCEDURE — 83721 ASSAY OF BLOOD LIPOPROTEIN: CPT | Performed by: NURSE PRACTITIONER

## 2023-12-18 PROCEDURE — 99214 OFFICE O/P EST MOD 30 MIN: CPT | Mod: 25 | Performed by: NURSE PRACTITIONER

## 2023-12-18 PROCEDURE — 90471 IMMUNIZATION ADMIN: CPT | Performed by: NURSE PRACTITIONER

## 2023-12-18 PROCEDURE — 90686 IIV4 VACC NO PRSV 0.5 ML IM: CPT | Performed by: NURSE PRACTITIONER

## 2023-12-18 PROCEDURE — 36415 COLL VENOUS BLD VENIPUNCTURE: CPT | Performed by: NURSE PRACTITIONER

## 2023-12-18 RX ORDER — ATORVASTATIN CALCIUM 40 MG/1
40 TABLET, FILM COATED ORAL DAILY
Qty: 90 TABLET | Refills: 1 | Status: SHIPPED | OUTPATIENT
Start: 2023-12-18 | End: 2024-07-01

## 2023-12-18 NOTE — PROGRESS NOTES
Assessment & Plan     1. Plantar fasciitis  He has had plantar fasciitis in the past.  This had responded to physical therapy.  He is trying the PT exercises at home without much success.  I am going to refer him to podiatry for consideration of corticosteroid injection    2. Elevated coronary artery calcium score - CAC 46 6/2023  Now on atorvastatin 10 mg daily.  Recheck lipids and hepatic profile    3. Chronic insomnia  Controlled on zolpidem    Patient Instructions   Flu shot today.    Schedule an appointment with Dr. Nazario Diaz at [facility].  I think you would make an excellent candidate for a corticosteroid injection in your foot.    Recheck cholesterol labs and liver labs today.        Elliot Edmond CNP  [facility]    Payton Owen is a 48 year old, presenting for the following health issues:    Foot Pain (Left heel and bottom of foot pain, started about 4 month ago)      12/18/2023     8:07 AM   Additional Questions   Roomed by Nancie CULP       History of Present Illness       Reason for visit:  Plantar fasciaitis  Symptom onset:  More than a month  Symptoms include:  Pain in heel and foot tendons  Symptom intensity:  Moderate  Symptom progression:  Staying the same  Had these symptoms before:  Yes  Has tried/received treatment for these symptoms:  Yes  Previous treatment was successful:  Yes  Prior treatment description:  Physical therapy  What makes it worse:  Walking on feet all day  What makes it better:  Rest streatching calf raises    He eats 4 or more servings of fruits and vegetables daily.He consumes 1 sweetened beverage(s) daily.He exercises with enough effort to increase his heart rate 30 to 60 minutes per day.  He exercises with enough effort to increase his heart rate 5 days per week. He is missing 1 dose(s) of medications per week.           Review of Systems   Constitutional, HEENT, cardiovascular, pulmonary, gi and gu systems are negative, except as  "otherwise noted.      Objective    /66 (BP Location: Right arm, Patient Position: Sitting, Cuff Size: Adult Regular)   Pulse 69   Temp 98.3  F (36.8  C)   Resp 16   Ht 1.702 m (5' 7\")   Wt 80.7 kg (178 lb)   SpO2 96%   BMI 27.88 kg/m    Body mass index is 27.88 kg/m .  Physical Exam   Pain with palpation to the left plantar fascia, proximal to the heel              "

## 2023-12-18 NOTE — PATIENT INSTRUCTIONS
Flu shot today.    Schedule an appointment with Dr. Nazario Diaz at [facility].  I think you would make an excellent candidate for a corticosteroid injection in your foot.    Recheck cholesterol labs and liver labs today.

## 2024-01-03 ENCOUNTER — MYC REFILL (OUTPATIENT)
Dept: INTERNAL MEDICINE | Facility: CLINIC | Age: 49
End: 2024-01-03
Payer: COMMERCIAL

## 2024-01-03 DIAGNOSIS — G47.00 INSOMNIA, UNSPECIFIED TYPE: ICD-10-CM

## 2024-01-03 RX ORDER — ZOLPIDEM TARTRATE 5 MG/1
TABLET ORAL
Qty: 30 TABLET | Refills: 0 | Status: SHIPPED | OUTPATIENT
Start: 2024-01-03 | End: 2024-03-04

## 2024-03-04 ENCOUNTER — MYC REFILL (OUTPATIENT)
Dept: INTERNAL MEDICINE | Facility: CLINIC | Age: 49
End: 2024-03-04
Payer: COMMERCIAL

## 2024-03-04 DIAGNOSIS — G47.00 INSOMNIA, UNSPECIFIED TYPE: ICD-10-CM

## 2024-03-05 RX ORDER — ZOLPIDEM TARTRATE 5 MG/1
TABLET ORAL
Qty: 30 TABLET | Refills: 0 | Status: SHIPPED | OUTPATIENT
Start: 2024-03-05 | End: 2024-05-23

## 2024-05-20 ENCOUNTER — MYC REFILL (OUTPATIENT)
Dept: INTERNAL MEDICINE | Facility: CLINIC | Age: 49
End: 2024-05-20
Payer: COMMERCIAL

## 2024-05-20 DIAGNOSIS — G47.00 INSOMNIA, UNSPECIFIED TYPE: ICD-10-CM

## 2024-05-21 RX ORDER — ZOLPIDEM TARTRATE 5 MG/1
TABLET ORAL
Qty: 30 TABLET | Refills: 0 | OUTPATIENT
Start: 2024-05-21

## 2024-05-23 ENCOUNTER — VIRTUAL VISIT (OUTPATIENT)
Dept: INTERNAL MEDICINE | Facility: CLINIC | Age: 49
End: 2024-05-23
Payer: COMMERCIAL

## 2024-05-23 DIAGNOSIS — G47.00 INSOMNIA, UNSPECIFIED TYPE: ICD-10-CM

## 2024-05-23 PROCEDURE — 99213 OFFICE O/P EST LOW 20 MIN: CPT | Mod: 95 | Performed by: NURSE PRACTITIONER

## 2024-05-23 RX ORDER — ZOLPIDEM TARTRATE 5 MG/1
TABLET ORAL
Qty: 30 TABLET | Refills: 0 | Status: SHIPPED | OUTPATIENT
Start: 2024-05-23 | End: 2024-07-30

## 2024-05-23 NOTE — PROGRESS NOTES
"Brayden is a 48 year old who is being evaluated via a billable video visit.          Assessment & Plan     Insomnia, unspecified type  The Ambien is working quite well for him.  He has no ill side effects from the Ambien.  He uses it as needed.    He does not sleepwalk, report any excessive grogginess, he does not mix this medication with alcohol.    He will be due for a physical with me later this summer and we will schedule this at his convenience.  - zolpidem (AMBIEN) 5 MG tablet; 1 at bedtime as needed.          BMI  Estimated body mass index is 27.88 kg/m  as calculated from the following:    Height as of 12/18/23: 1.702 m (5' 7\").    Weight as of 12/18/23: 80.7 kg (178 lb).             Subjective   Brayden is a 48 year old, presenting for the following health issues:  Refill Request (Medication refill Ambien.)      5/23/2024    11:31 AM   Additional Questions   Roomed by VERNELL-LPN     [facility]     Med check on his Ambien.  He is using this for insomnia and it is working well for him.      Objective           Vitals:  No vitals were obtained today due to virtual visit.    Physical Exam   GENERAL: alert and no distress  EYES: Eyes grossly normal to inspection.  No discharge or erythema, or obvious scleral/conjunctival abnormalities.  RESP: No audible wheeze, cough, or visible cyanosis.    SKIN: Visible skin clear. No significant rash, abnormal pigmentation or lesions.  NEURO: Cranial nerves grossly intact.  Mentation and speech appropriate for age.  PSYCH: Appropriate affect, tone, and pace of words          Video-Visit Details    Type of service:  Video Visit   Originating Location (pt. Location): Home    Distant Location (provider location):  On-site  Platform used for Video Visit: Simon  Signed Electronically by: Elliot Edmond CNP    "

## 2024-07-01 ENCOUNTER — MYC REFILL (OUTPATIENT)
Dept: INTERNAL MEDICINE | Facility: CLINIC | Age: 49
End: 2024-07-01
Payer: COMMERCIAL

## 2024-07-01 DIAGNOSIS — Z82.49 FAMILY HISTORY OF CORONARY ARTERIOSCLEROSIS: ICD-10-CM

## 2024-07-01 RX ORDER — ATORVASTATIN CALCIUM 40 MG/1
40 TABLET, FILM COATED ORAL DAILY
Qty: 90 TABLET | Refills: 2 | Status: SHIPPED | OUTPATIENT
Start: 2024-07-01 | End: 2024-09-26

## 2024-07-09 ENCOUNTER — OFFICE VISIT (OUTPATIENT)
Dept: INTERNAL MEDICINE | Facility: CLINIC | Age: 49
End: 2024-07-09
Payer: COMMERCIAL

## 2024-07-09 VITALS
HEIGHT: 67 IN | DIASTOLIC BLOOD PRESSURE: 62 MMHG | BODY MASS INDEX: 26.68 KG/M2 | OXYGEN SATURATION: 98 % | SYSTOLIC BLOOD PRESSURE: 100 MMHG | TEMPERATURE: 98.2 F | HEART RATE: 52 BPM | RESPIRATION RATE: 16 BRPM | WEIGHT: 170 LBS

## 2024-07-09 DIAGNOSIS — K13.0 LIP LESION: ICD-10-CM

## 2024-07-09 DIAGNOSIS — Z00.00 ANNUAL PHYSICAL EXAM: ICD-10-CM

## 2024-07-09 DIAGNOSIS — G47.00 INSOMNIA, UNSPECIFIED TYPE: ICD-10-CM

## 2024-07-09 DIAGNOSIS — R93.1 ELEVATED CORONARY ARTERY CALCIUM SCORE: ICD-10-CM

## 2024-07-09 DIAGNOSIS — Z79.899 CONTROLLED SUBSTANCE AGREEMENT SIGNED: Primary | ICD-10-CM

## 2024-07-09 DIAGNOSIS — Z80.42 FAMILY HISTORY OF PROSTATE CANCER: ICD-10-CM

## 2024-07-09 DIAGNOSIS — M67.40 GANGLION CYST: ICD-10-CM

## 2024-07-09 DIAGNOSIS — R09.81 NASAL CONGESTION: ICD-10-CM

## 2024-07-09 DIAGNOSIS — Z82.49 FAMILY HISTORY OF CORONARY ARTERIOSCLEROSIS: ICD-10-CM

## 2024-07-09 DIAGNOSIS — F10.20 UNCOMPLICATED ALCOHOL DEPENDENCE (H): ICD-10-CM

## 2024-07-09 PROCEDURE — 80053 COMPREHEN METABOLIC PANEL: CPT | Performed by: NURSE PRACTITIONER

## 2024-07-09 PROCEDURE — 99396 PREV VISIT EST AGE 40-64: CPT | Performed by: NURSE PRACTITIONER

## 2024-07-09 PROCEDURE — 36415 COLL VENOUS BLD VENIPUNCTURE: CPT | Performed by: NURSE PRACTITIONER

## 2024-07-09 PROCEDURE — G0103 PSA SCREENING: HCPCS | Performed by: NURSE PRACTITIONER

## 2024-07-09 PROCEDURE — 99213 OFFICE O/P EST LOW 20 MIN: CPT | Mod: 25 | Performed by: NURSE PRACTITIONER

## 2024-07-09 PROCEDURE — 80061 LIPID PANEL: CPT | Performed by: NURSE PRACTITIONER

## 2024-07-09 SDOH — HEALTH STABILITY: PHYSICAL HEALTH: ON AVERAGE, HOW MANY DAYS PER WEEK DO YOU ENGAGE IN MODERATE TO STRENUOUS EXERCISE (LIKE A BRISK WALK)?: 7 DAYS

## 2024-07-09 ASSESSMENT — SOCIAL DETERMINANTS OF HEALTH (SDOH): HOW OFTEN DO YOU GET TOGETHER WITH FRIENDS OR RELATIVES?: ONCE A WEEK

## 2024-07-09 NOTE — LETTER
[facility]  07/09/24  Patient: Brayden Peres  YOB: 1975  Medical Record Number: [id number]                                                                                  Non-Opioid Controlled Substance Agreement    This is an agreement between you and your provider regarding safe and appropriate use of controlled substances prescribed by your care team. Controlled substances are?medicines that can cause physical and mental dependence (abuse).     There are strict laws about having and using these medicines. We here at [facility] are  committed to working with you in your efforts to get better. To support you in this work, we'll help you schedule regular office appointments for medicine refills. If we must cancel or change your appointment for any reason, we'll make sure you have enough medicine to last until your next appointment.     As a Provider, I will:   Listen carefully to your concerns while treating you with respect.   Recommend a treatment plan that I believe is in your best interest and may involve therapies other than medicine.    Talk with you often about the possible benefits and the risk of harm of any medicine that we prescribe for you.  Assess the safety of this medicine and check how well it works.    Provide a plan on how to taper (discontinue or go off) using this medicine if the decision is made to stop its use.      ::  As a Patient, I understand controlled substances:     Are prescribed by my care provider to help me function or work and manage my condition(s).?  Are strong medicines and can cause serious side effects.     Need to be taken exactly as prescribed.?Combining controlled substances with certain medicines or chemicals (such as illegal drugs, alcohol, sedatives, sleeping pills, and benzodiazepines) can be dangerous or even fatal.? If I stop taking my medicines suddenly, I may have severe withdrawal symptoms.     The risks,  benefits, and side effects of these medicine(s) were explained to me. I agree that:    I will take part in other treatments as advised by my care team. This may be psychiatry or counseling, physical therapy, behavioral therapy, group treatment or a referral to specialist.    I will keep all my appointments and understand this is part of the monitoring of controlled substances.?My care team may require an office visit for EVERY controlled substance refill. If I miss appointments or don t follow instructions, my care team may stop my medicine    I will take my medicines as prescribed. I will not change the dose or schedule unless my care team tells me to. There will be no refills if I run out early.      I may be asked to come to the clinic and complete a urine drug test or complete a pill count. If I don t give a urine sample or participate in a pill count, the care team may stop my medicine.    I will only receive controlled substance prescriptions from this clinic. If I am treated by another provider, I will tell them that I am taking controlled substances and that I have a treatment agreement with this provider. I will inform my [facility] care team within one business day if I am given a prescription for any controlled substance by another healthcare provider. My [facility] care team can contact other providers and pharmacists about my use of any medicines.    It is up to me to make sure that I don't run out of my medicines on weekends or holidays.?If my care team is willing to refill my prescription without a visit, I must request refills only during office hours. Refills may take up to 3 business days to process. I will use one pharmacy to fill all my controlled substance prescriptions. I will notify the clinic about any changes to my insurance or medicine availability.    I am responsible for my prescriptions. If the medicine/prescription is lost, stolen or destroyed, it will not be replaced.?I  also agree not to share controlled substance medicines with anyone.     I am aware I should not use any illegal or recreational drugs. I agree not to drink alcohol unless my care team says I can.     If I enroll in the [place] Medical Cannabis program, I will tell my care team before my next refill.    I will tell my care team right away if I become pregnant, have a new medical problem treated outside of my regular clinic, or have a change in my medicines.     I understand that this medicine can affect my thinking, judgment and reaction time.? Alcohol and drugs affect the brain and body, which can affect the safety of my driving. Being under the influence of alcohol or drugs can affect my decision-making, behaviors, personal safety and the safety of others. Driving while impaired (DWI) can occur if a person is driving, operating or in physical control of a car, motorcycle, boat, snowmobile, ATV, motorbike, off-road vehicle or any other motor vehicle ([place] Statute 169A.20). I understand the risk if I choose to drive or operate any vehicle or machinery.    I understand that if I do not follow any of the conditions above, my prescriptions or treatment may be stopped or changed.   I agree that my provider, clinic care team and pharmacy may work with any city, state or federal law enforcement agency that investigates the misuse, sale or other diversion of my controlled medicine. I will allow my provider to discuss my care with, or share a copy of, this agreement with any other treating provider, pharmacy or emergency room where I receive care.     I have read this agreement and have asked questions about anything I did not understand.    ________________________________________________________  Patient Signature - Brayden Peres     ___________________                   Date     ________________________________________________________  Provider Signature - Elliot Edmond, CNP       ___________________                    Date     ________________________________________________________  Witness Signature (required if provider not present while patient signing)          ___________________                   Date

## 2024-07-09 NOTE — PROGRESS NOTES
Preventive Care Visit  [facility]  Elliot Edmond, CNP, Nurse Practitioner - Family  Jul 9, 2024      Assessment & Plan     Annual physical exam  Kike is doing well.  He is painting his house right now.  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    Insomnia  The Ambien is working well for him    Elevated coronary artery calcium score  We were able to get his LDL below 100 after increasing the atorvastatin to 40 mg.  HDL 62.    CT calcium score from 2023 reviewed, total score 48, 75th percentile    Family history of prostate cancer  PSA continues to be in normal range.  His father was diagnosed with prostate cancer in his 60s.  Patient is asymptomatic urologically  - PSA, screen; Future  - PSA, screen    Family history of coronary arteriosclerosis  Father diagnosed with coronary artery disease in his 60s.  His father had coronary artery bypass surgery.  - Comprehensive metabolic panel; Future  - Lipid Profile (Chol, Trig, HDL, LDL calc); Future  - Comprehensive metabolic panel  - Lipid Profile (Chol, Trig, HDL, LDL calc)    Nasal congestion  This does not seem to be related to allergies or any underlying infectious problem.  It may be an issue with his anatomy/nasal polyps, etc.  We will have him see ENT.  He does snore.  - Adult ENT  Referral; Future    Ganglion cyst  He has a small ganglion cyst on the second toe on his left foot.  The benign nature of this lesion was discussed with the patient.  He can see orthopedics if he wants it removed or treated    Lip lesion  He has a small purple lesion on his lower lip; had seen dermatology about 6 years ago.  That note was reviewed with him.  He was diagnosed with venous lake and told that the lesion was benign.    Patient Instructions   Thank you for signing the CSA    No changes to meds.     Update labs.     The lip lesion was called  "a \"venous lake\" by derm. You can go back to [facility] if you want second opinion.     Ganglion cyst on the toe. This can be addressed by [facility] or [facility] if you wish.     Consult to [facility] in [place] for nasal congestion.               BMI  Estimated body mass index is 27.03 kg/m  as calculated from the following:    Height as of this encounter: 1.689 m (5' 6.5\").    Weight as of this encounter: 77.1 kg (170 lb).       Counseling  Appropriate preventive services were discussed with this patient, including applicable screening as appropriate for fall prevention, nutrition, physical activity, Tobacco-use cessation, weight loss and cognition.  Checklist reviewing preventive services available has been given to the patient.  Reviewed patient's diet, addressing concerns and/or questions.   The patient reports drinking more than 3 alcoholic drinks per day and/or more than 7 drhnks per week. The patient was counseled and given information about possible harmful effects of excessive alcohol intake.        Payton Owen is a 48 year old, presenting for the following:  Physical (Fasting  - check spot on lower lip - check spot on left foot, 2nd toe - nasal congestion more constant, hard to breath)        7/9/2024    11:42 AM   Additional Questions   Roomed by Nancie CULP        Health Care Directive  Patient does not have a Health Care Directive or Living Will: Discussed advance care planning with patient; information given to patient to review.    HPI  Here for adult wellness visit.            7/9/2024   General Health   How would you rate your overall physical health? Good   Feel stress (tense, anxious, or unable to sleep) To some extent      (!) STRESS CONCERN      7/9/2024   Nutrition   Three or more servings of calcium each day? Yes   Diet: Regular (no restrictions)   How many servings of fruit and vegetables per day? 4 or more   How many sweetened beverages each day? 0-1            7/9/2024 "   Exercise   Days per week of moderate/strenous exercise 7 days            7/9/2024   Social Factors   Frequency of gathering with friends or relatives Once a week   Worry food won't last until get money to buy more No   Food not last or not have enough money for food? No   Do you have housing? (Housing is defined as stable permanent housing and does not include staying ouside in a car, in a tent, in an abandoned building, in an overnight shelter, or couch-surfing.) Yes   Are you worried about losing your housing? No   Lack of transportation? No   Unable to get utilities (heat,electricity)? No            7/9/2024   Dental   Dentist two times every year? Yes            7/9/2024   TB Screening   Were you born outside of the [place]? No            Today's PHQ-2 Score:       7/9/2024    11:32 AM   PHQ-2 ( 1999 [employer])   Q1: Little interest or pleasure in doing things 0   Q2: Feeling down, depressed or hopeless 0   PHQ-2 Score 0   Q1: Little interest or pleasure in doing things Not at all   Q2: Feeling down, depressed or hopeless Not at all   PHQ-2 Score 0           7/9/2024   Substance Use   Alcohol more than 3/day or more than 7/wk Yes   How often do you have a drink containing alcohol 2 to 3 times a week   How many alcohol drinks on typical day 3 or 4   How often do you have 5+ drinks at one occasion Never   Audit 2/3 Score 1   How often not able to stop drinking once started Never   How often failed to do what normally expected Never   How often needed first drink in am after a heavy drinking session Never   How often feeling of guilt or remorse after drinking Never   How often unable to remember what happened the night before Never   Have you or someone else been injured because of your drinking No   Has anyone been concerned or suggested you cut down on drinking No   TOTAL SCORE - AUDIT 4   Do you use any other substances recreationally? No        Social History     Tobacco Use    Smoking status: Former     Current  "packs/day: 0.50     Types: Cigarettes     Passive exposure: Never    Smokeless tobacco: Never   Vaping Use    Vaping status: Never Used           7/9/2024   STI Screening   New sexual partner(s) since last STI/HIV test? No      ASCVD Risk   The 10-year ASCVD risk score (Rosalina GARLAND, et al., 2019) is: 2.1%    Values used to calculate the score:      Age: 48 years      Sex: Male      Is Non- : No      Diabetic: No      Tobacco smoker: No      Systolic Blood Pressure: 100 mmHg      Is BP treated: No      HDL Cholesterol: 54 mg/dL      Total Cholesterol: 233 mg/dL       Reviewed and updated as needed this visit by Provider                             Objective    Exam  /62 (BP Location: Right arm, Patient Position: Sitting, Cuff Size: Adult Regular)   Pulse 52   Temp 98.2  F (36.8  C)   Resp 16   Ht 1.689 m (5' 6.5\")   Wt 77.1 kg (170 lb)   SpO2 98%   BMI 27.03 kg/m     Estimated body mass index is 27.03 kg/m  as calculated from the following:    Height as of this encounter: 1.689 m (5' 6.5\").    Weight as of this encounter: 77.1 kg (170 lb).    Physical Exam  GENERAL: alert and no distress  NECK: no adenopathy, no asymmetry, masses, or scars  RESP: lungs clear to auscultation - no rales, rhonchi or wheezes  CV: regular rate and rhythm, normal S1 S2, no S3 or S4, no murmur, click or rub, no peripheral edema  ABDOMEN: soft, nontender, no hepatosplenomegaly, no masses and bowel sounds normal  MS: no gross musculoskeletal defects noted, no edema        Signed Electronically by: Elliot Edmond, CNP    "

## 2024-07-09 NOTE — PATIENT INSTRUCTIONS
"Thank you for signing the CSA    No changes to meds.     Update labs.     The lip lesion was called a \"venous lake\" by derm. You can go [facility] if you want second opinion.     Ganglion cyst on the toe. This can be addressed by [facility] or [facility] if you wish.     Consult to [facility] in [place] for nasal congestion.   "

## 2024-07-10 PROBLEM — G47.00 INSOMNIA, UNSPECIFIED TYPE: Status: ACTIVE | Noted: 2022-03-04

## 2024-07-10 PROBLEM — M67.40 GANGLION CYST: Status: ACTIVE | Noted: 2024-07-10

## 2024-07-10 LAB
ALBUMIN SERPL BCG-MCNC: 4.7 G/DL (ref 3.5–5.2)
ALP SERPL-CCNC: 57 U/L (ref 40–150)
ALT SERPL W P-5'-P-CCNC: 40 U/L (ref 0–70)
ANION GAP SERPL CALCULATED.3IONS-SCNC: 9 MMOL/L (ref 7–15)
AST SERPL W P-5'-P-CCNC: 33 U/L (ref 0–45)
BILIRUB SERPL-MCNC: 0.8 MG/DL
BUN SERPL-MCNC: 13.6 MG/DL (ref 6–20)
CALCIUM SERPL-MCNC: 9.3 MG/DL (ref 8.6–10)
CHLORIDE SERPL-SCNC: 103 MMOL/L (ref 98–107)
CHOLEST SERPL-MCNC: 169 MG/DL
CREAT SERPL-MCNC: 0.9 MG/DL (ref 0.67–1.17)
DEPRECATED HCO3 PLAS-SCNC: 26 MMOL/L (ref 22–29)
EGFRCR SERPLBLD CKD-EPI 2021: >90 ML/MIN/1.73M2
FASTING STATUS PATIENT QL REPORTED: YES
FASTING STATUS PATIENT QL REPORTED: YES
GLUCOSE SERPL-MCNC: 95 MG/DL (ref 70–99)
HDLC SERPL-MCNC: 62 MG/DL
LDLC SERPL CALC-MCNC: 92 MG/DL
NONHDLC SERPL-MCNC: 107 MG/DL
POTASSIUM SERPL-SCNC: 4.2 MMOL/L (ref 3.4–5.3)
PROT SERPL-MCNC: 7.3 G/DL (ref 6.4–8.3)
PSA SERPL DL<=0.01 NG/ML-MCNC: 0.95 NG/ML (ref 0–2.5)
SODIUM SERPL-SCNC: 138 MMOL/L (ref 135–145)
TRIGL SERPL-MCNC: 77 MG/DL

## 2024-07-30 ENCOUNTER — MYC REFILL (OUTPATIENT)
Dept: INTERNAL MEDICINE | Facility: CLINIC | Age: 49
End: 2024-07-30
Payer: COMMERCIAL

## 2024-07-30 DIAGNOSIS — G47.00 INSOMNIA, UNSPECIFIED TYPE: ICD-10-CM

## 2024-07-31 RX ORDER — ZOLPIDEM TARTRATE 5 MG/1
TABLET ORAL
Qty: 30 TABLET | Refills: 0 | Status: SHIPPED | OUTPATIENT
Start: 2024-07-31 | End: 2024-09-26

## 2024-09-26 ENCOUNTER — OFFICE VISIT (OUTPATIENT)
Dept: INTERNAL MEDICINE | Facility: CLINIC | Age: 49
End: 2024-09-26
Payer: COMMERCIAL

## 2024-09-26 VITALS
WEIGHT: 176.2 LBS | RESPIRATION RATE: 18 BRPM | HEART RATE: 71 BPM | HEIGHT: 67 IN | TEMPERATURE: 97.9 F | SYSTOLIC BLOOD PRESSURE: 120 MMHG | BODY MASS INDEX: 27.65 KG/M2 | OXYGEN SATURATION: 97 % | DIASTOLIC BLOOD PRESSURE: 76 MMHG

## 2024-09-26 DIAGNOSIS — Z82.49 FAMILY HISTORY OF CORONARY ARTERIOSCLEROSIS: ICD-10-CM

## 2024-09-26 DIAGNOSIS — M54.2 CERVICALGIA: Primary | ICD-10-CM

## 2024-09-26 DIAGNOSIS — G47.00 INSOMNIA, UNSPECIFIED TYPE: ICD-10-CM

## 2024-09-26 PROCEDURE — 99214 OFFICE O/P EST MOD 30 MIN: CPT | Performed by: NURSE PRACTITIONER

## 2024-09-26 PROCEDURE — G2211 COMPLEX E/M VISIT ADD ON: HCPCS | Performed by: NURSE PRACTITIONER

## 2024-09-26 RX ORDER — ATORVASTATIN CALCIUM 40 MG/1
40 TABLET, FILM COATED ORAL DAILY
Qty: 90 TABLET | Refills: 2 | Status: SHIPPED | OUTPATIENT
Start: 2024-09-26

## 2024-09-26 RX ORDER — ZOLPIDEM TARTRATE 5 MG/1
TABLET ORAL
Qty: 30 TABLET | Refills: 0 | Status: SHIPPED | OUTPATIENT
Start: 2024-09-26

## 2024-09-26 NOTE — PROGRESS NOTES
"  Assessment & Plan     Insomnia, unspecified type  Symptoms controlled on zolpidem.  CSA up-to-date  - zolpidem (AMBIEN) 5 MG tablet; 1 at bedtime as needed.    Family history of coronary arteriosclerosis  Controlled on atorvastatin.  History of elevated coronary artery calcium score, 48 when checked in 2023.  This placed him in the 75th percentile when compared to age and gender  - atorvastatin (LIPITOR) 40 MG tablet; Take 1 tablet (40 mg) by mouth daily.    Cervicalgia  For the last month or so he has had some radicular symptoms involving his left upper extremity.  Spurling's maneuver is positive on the left.  We talked about pursuing an MRI of the C-spine.  We will start with physical therapy first.  If his symptoms do not improve, consider MRI of the neck and referral to spine clinic.  - Physical Therapy  Referral; Future    The longitudinal plan of care for the diagnosis(es)/condition(s) as documented were addressed during this visit. Due to the added complexity in care, I will continue to support Brayden in the subsequent management and with ongoing continuity of care.       BMI  Estimated body mass index is 28.01 kg/m  as calculated from the following:    Height as of this encounter: 1.689 m (5' 6.5\").    Weight as of this encounter: 79.9 kg (176 lb 3.2 oz).             Subjective   Brayden is a 48 year old, presenting for the following health issues:  Shoulder Pain      9/26/2024    10:04 AM   Additional Questions   Roomed by NANDA LITTLE   Accompanied by SELF     Shoulder Pain         He has had some worsening left arm pain with numbness and tingling radiating down into his left hand, involving his left thumb.  It will wake him up at night.  No precipitating injury.  He has been more active with softball and painting his house recently.    No strokelike symptoms.      Objective    /76 (BP Location: Right arm, Patient Position: Sitting, Cuff Size: Adult Regular)   Pulse 71   Temp 97.9  F (36.6 " " C)   Resp 18   Ht 1.689 m (5' 6.5\")   Wt 79.9 kg (176 lb 3.2 oz)   SpO2 97%   BMI 28.01 kg/m    Body mass index is 28.01 kg/m .  Physical Exam   Spurling's maneuver is positive on the left.  Phalen sign negative.  Stephania sign negative.            Signed Electronically by: Elliot Edmond CNP    "

## 2024-10-01 ENCOUNTER — THERAPY VISIT (OUTPATIENT)
Dept: PHYSICAL THERAPY | Facility: REHABILITATION | Age: 49
End: 2024-10-01
Attending: NURSE PRACTITIONER
Payer: COMMERCIAL

## 2024-10-01 DIAGNOSIS — M25.512 CHRONIC LEFT SHOULDER PAIN: ICD-10-CM

## 2024-10-01 DIAGNOSIS — M54.2 CERVICALGIA: ICD-10-CM

## 2024-10-01 DIAGNOSIS — M54.2 NECK PAIN: Primary | ICD-10-CM

## 2024-10-01 DIAGNOSIS — G89.29 CHRONIC LEFT SHOULDER PAIN: ICD-10-CM

## 2024-10-01 PROCEDURE — 97161 PT EVAL LOW COMPLEX 20 MIN: CPT | Mod: GP | Performed by: PHYSICAL THERAPIST

## 2024-10-01 PROCEDURE — 97140 MANUAL THERAPY 1/> REGIONS: CPT | Mod: GP | Performed by: PHYSICAL THERAPIST

## 2024-10-01 PROCEDURE — 97110 THERAPEUTIC EXERCISES: CPT | Mod: GP | Performed by: PHYSICAL THERAPIST

## 2024-10-01 NOTE — PROGRESS NOTES
PHYSICAL THERAPY EVALUATION  Type of Visit: Evaluation       Fall Risk Screen:  Fall screen completed by: PT  Have you fallen 2 or more times in the past year?: No  Have you fallen and had an injury in the past year?: No  Is patient a fall risk?: No    Subjective       Presenting condition or subjective complaint: tendonitus weakness numbness in left shoulder and arm numbness down to fingers    The patient reports to therapy with a chief complaint of neck pain with radicular L arm pain. Hx of tendonintis in a variety of places. Has had PT for L biceps tendonitis approx 10 years ago. These symptoms are on and off. 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 Mid to late July started getting numbness in the L arm. The symptoms go all the way into the fingers. At this point has symptoms in the thumb. Left Handed. Likes to do the median n. Stretch to relieve symptoms     Running/walking OK, throwing a ball not ok.     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 R rot min limited stiffness, L rot min limited stiffness.  Shoulder AROM: flexion and abduction 170 painfree. Scapular winging on L     MYOTOMES:  stregnth: 80kg toribio all other myotomes WNL   DTR S:   CORD SIGNS:   DERMATOMES:   NEURAL TENSION:  positive median neural tension  FLEXIBILITY:    SPECIAL TESTS:  positive spurlings, improved pain with traction   PALPATION:  min tender to L UT, increased radicular symptoms with palpation of RC and latissmus     Assessment & Plan   CLINICAL IMPRESSIONS  Medical Diagnosis: Cervicalgia    Treatment Diagnosis: Neck Pain/L Radicular Symptoms   Impression/Assessment: Patient is a 48 year old male with neck/radicular complaints.  The following significant findings have been identified: Pain, Decreased ROM/flexibility, Decreased joint mobility, Decreased strength, Inflammation, Impaired muscle performance, Decreased activity tolerance, and Impaired posture. These impairments interfere with their ability to perform self care tasks, work tasks, recreational activities, household chores, driving , household mobility, and community mobility as compared to previous level of function.     Clinical Decision Making (Complexity):  Clinical Presentation: Stable/Uncomplicated  Clinical Presentation Rationale: based on medical and personal factors listed in PT evaluation  Clinical Decision Making (Complexity): Low complexity    PLAN OF CARE  Treatment Interventions:  Modalities: Cryotherapy, Hot Pack, Mechanical Traction  Interventions: Gait Training, Manual Therapy, Neuromuscular Re-education, Therapeutic Activity, Therapeutic Exercise, Self-Care/Home Management    Long Term Goals     PT Goal 1  Goal Identifier: Prolonged Positioning  Goal Description: The patient will be able to  sustain a position, either sitting or standing x30 minutes with appropriate posture and pain <3/10.  Rationale: to maximize safety and independence with performance of ADLs and functional tasks;to maximize safety and independence with self cares  Target Date: 11/26/24  PT Goal 2  Goal Identifier: Sleeping  Goal Description: The patient will be able to sleep through the night without waking due to pain to improve restful sleep  Target Date: 11/26/24      Frequency of Treatment: 1x/week  Duration of Treatment: 8 weeks    Recommended Referrals to Other Professionals:   Education Assessment:   Learner/Method: Patient  Education Comments: Eager to participate in therapy    Risks and benefits of evaluation/treatment have been explained.   Patient/Family/caregiver agrees with Plan of Care.     Evaluation Time:     PT Eval, Low Complexity Minutes (88091): 15       Signing Clinician: Doris Prince PT

## 2024-10-07 ENCOUNTER — THERAPY VISIT (OUTPATIENT)
Dept: PHYSICAL THERAPY | Facility: REHABILITATION | Age: 49
End: 2024-10-07
Payer: COMMERCIAL

## 2024-10-07 DIAGNOSIS — M25.512 CHRONIC LEFT SHOULDER PAIN: ICD-10-CM

## 2024-10-07 DIAGNOSIS — G89.29 CHRONIC LEFT SHOULDER PAIN: ICD-10-CM

## 2024-10-07 DIAGNOSIS — M54.2 NECK PAIN: Primary | ICD-10-CM

## 2024-10-07 PROCEDURE — 97140 MANUAL THERAPY 1/> REGIONS: CPT | Mod: GP | Performed by: PHYSICAL THERAPIST

## 2024-10-07 PROCEDURE — 97110 THERAPEUTIC EXERCISES: CPT | Mod: GP | Performed by: PHYSICAL THERAPIST

## 2024-10-07 PROCEDURE — 97112 NEUROMUSCULAR REEDUCATION: CPT | Mod: GP | Performed by: PHYSICAL THERAPIST

## 2024-10-15 ENCOUNTER — THERAPY VISIT (OUTPATIENT)
Dept: PHYSICAL THERAPY | Facility: REHABILITATION | Age: 49
End: 2024-10-15
Payer: COMMERCIAL

## 2024-10-15 DIAGNOSIS — G89.29 CHRONIC LEFT SHOULDER PAIN: ICD-10-CM

## 2024-10-15 DIAGNOSIS — M25.512 CHRONIC LEFT SHOULDER PAIN: ICD-10-CM

## 2024-10-15 DIAGNOSIS — M54.2 NECK PAIN: Primary | ICD-10-CM

## 2024-10-15 PROCEDURE — 97112 NEUROMUSCULAR REEDUCATION: CPT | Mod: GP | Performed by: PHYSICAL THERAPIST

## 2024-10-15 PROCEDURE — 97140 MANUAL THERAPY 1/> REGIONS: CPT | Mod: GP | Performed by: PHYSICAL THERAPIST

## 2024-10-23 ENCOUNTER — THERAPY VISIT (OUTPATIENT)
Dept: PHYSICAL THERAPY | Facility: REHABILITATION | Age: 49
End: 2024-10-23
Payer: COMMERCIAL

## 2024-10-23 DIAGNOSIS — M25.512 CHRONIC LEFT SHOULDER PAIN: ICD-10-CM

## 2024-10-23 DIAGNOSIS — G89.29 CHRONIC LEFT SHOULDER PAIN: ICD-10-CM

## 2024-10-23 DIAGNOSIS — M54.2 NECK PAIN: Primary | ICD-10-CM

## 2024-10-23 PROCEDURE — 97140 MANUAL THERAPY 1/> REGIONS: CPT | Mod: GP | Performed by: PHYSICAL THERAPIST

## 2024-10-23 PROCEDURE — 97110 THERAPEUTIC EXERCISES: CPT | Mod: GP | Performed by: PHYSICAL THERAPIST

## 2024-10-23 NOTE — PROGRESS NOTES
Assessment:   The patient has attended PT consistently for 4 visits. He does report a mild improvement in symptoms, but does continue to present with nearly constant L thumb tingling that increases with certain head positions including L sidebending, L rotation, cervical extension and retraction. At this point due to mild improvements in symptoms, but overall continued limitations since beginning therapy, recommended that the patient connect with a spine provider regarding potential next steps in treatment.       PLAN  Continue therapy per current plan of care.  Follow with spine provider regarding next steps in care.     Beginning/End Dates of Progress Note Reporting Period:  10/01/24 to 10/23/2024    Referring Provider:  Elliot Edmond     10/23/24 0500   Appointment Info   Signing clinician's name / credentials Doris Prince, PT DPT   Total/Authorized Visits E&T   Visits Used 4   Medical Diagnosis Cervicalgia   PT Tx Diagnosis Neck Pain/L Radicular Symptoms   Other pertinent information left handed   Progress Note/Certification   Therapy Frequency 1x/week   Predicted Duration 8 weeks   Progress Note Completed Date 10/01/24   GOALS   PT Goals 2   PT Goal 1   Goal Identifier Prolonged Positioning   Goal Description The patient will be able to sustain a position, either sitting or standing x30 minutes with appropriate posture and pain <3/10.   Rationale to maximize safety and independence with performance of ADLs and functional tasks;to maximize safety and independence with self cares   Goal Progress goal progressing gradually 25-50% improvement   Target Date 11/26/24   PT Goal 2   Goal Identifier Sleeping   Goal Description The patient will be able to sleep through the night without waking due to pain to improve restful sleep   Goal Progress goal progressing gradually 25-50% improvement   Target Date 11/26/24   Subjective Report   Subjective Report The patient has attended 4 visits of PT for  treatment of neck and L shoulder pain and L UE radiculopathy. The patient has reported mild improvements in pain and function. Continued constant L thumb numbness and tingling that is worsened with certain head positions. Exercises and manual therapy have been somewhat helpful, but much of the improvements in symptoms are from avoiding aggravating positions.   Objective Measures   Objective Measures Objective Measure 1;Objective Measure 2;Objective Measure 3   Objective Measure 1   Objective Measure Cervical mobility:   Details cervical flexion WNL, cervical extension mod limited elicits L UE symptoms, L sidebending min limited increased L radicular pain, L rotation increased radicular symptoms   Objective Measure 2   Objective Measure MMT:   Details UE myotomes WNL   Objective Measure 3   Objective Measure median nerve neural tension testing Pos L>R   Treatment Interventions (PT)   Interventions Therapeutic Procedure/Exercise;Therapeutic Activity;Manual Therapy;Neuromuscular Re-education   Therapeutic Procedure/Exercise   Therapeutic Procedures: strength, endurance, ROM, flexibility minutes (88173) 10   Therapeutic Procedures Ther Proc 3;Ther Proc 2   Ther Proc 1 Education   Ther Proc 1 - Details educated and discussed next steps in POC   PTRx Ther Proc 1 Supine Cervical Retraction   PTRx Ther Proc 1 - Details not today   PTRx Ther Proc 2 Cervical Retraction With Patient Overpressure   PTRx Ther Proc 2 - Details not today   PTRx Ther Proc 3 Shoulder Scapular Retraction with Tubing   PTRx Ther Proc 3 - Details HEP   PTRx Ther Proc 4 Prone Scapula I   PTRx Ther Proc 4 - Details HEP   PTRx Ther Proc 5 Prone Scapula T   PTRx Ther Proc 5 - Details HEP   Skilled Intervention to improve pain and mobility   Patient Response/Progress good understanding of next steps in plan   Therapeutic Activity   PTRx Ther Act 1 Education Sheet General   PTRx Ther Act 1 - Details No Notes   Skilled Intervention no charge   Patient  Response/Progress good tolerance   Neuromuscular Re-education   PTRx Neuro Re-ed 1 Median Nerve Tensioners   PTRx Neuro Re-ed 1 - Details HEP   PTRx Neuro Re-ed 2 Shoulder Theraband Rows   PTRx Neuro Re-ed 2 - Details HEP   PTRx Neuro Re-ed 3 Shoulder Theraband Low Row/Pulldown   PTRx Neuro Re-ed 3 - Details HEP   PTRx Neuro Re-ed 4 Serratus Slides on Wall   PTRx Neuro Re-ed 4 - Details HEP   PTRx Neuro Re-ed 5 Push-Up Plus At Counter   PTRx Neuro Re-ed 5 - Details HEP   PTRx Neuro Re-ed 6 Scapular Stabilization Shoulder Abduction and Wall Slide   PTRx Neuro Re-ed 6 - Details HEP   Skilled Intervention cueing for scap retraction   Manual Therapy   Manual Therapy: Mobilization, MFR, MLD, friction massage minutes (69274) 25   Manual Therapy Manual Therapy 2;Manual Therapy 3   Manual Therapy 1 supine cervical manual traction, STM to L UT and middle trap, TPR to L pec and latissmus   Manual Therapy 1 - Details legs elevated   Skilled Intervention to improve radicular symptoms   Patient Response/Progress improved symptoms, mild change in symptoms today   Education   Learner/Method Patient   Education Comments Eager to participate in therapy   Plan   Home program see PTRX on phone   Updates to plan of care plan to schedule with Spine   Plan for next session continue to progress postural/scap stab, MT as indicated   Comments   Comments The patient has attended PT consistently for 4 visits. He does report a mild improvement in symptoms, but does continue to present with nearly constant L thumb tingling that increases with certain head positions including L sidebending, L rotation, cervical extension and retraction. At this point due to mild improvements in symptoms, but overall continued limitations since beginning therapy, recommended that the patient connect with a spine provider regarding potential next steps in treatment.   Total Session Time   Timed Code Treatment Minutes 35   Total Treatment Time (sum of timed and  untimed services) 35

## 2024-10-25 NOTE — PROGRESS NOTES
ASSESSMENT: Brayden Peres is a 48 year old male with past medical history significant for insomnia, alcohol dependence who presents today for new patient evaluation of a greater than 3-month history of pain radiating down the left upper extremity with associated numbness, tingling, weakness.  Symptoms are persistent despite conservative treatment including NSAIDs and physical therapy.  Patient has not had any imaging of the cervical spine.  Suspect he has a disc bulge or protrusion with left C6 nerve root compromise.  Patient was neurologically intact on exam.        PLAN:  A shared decision making model was used.  The patient's values and choices were respected.  The following represents what was discussed and decided upon by the physician assistant and the patient.      1.  DIAGNOSTIC TESTS:    - I ordered and MRI cervical spine for further evaluation.  Patient has had symptoms for more than 3 months.  They are persistent despite conservative treatment.  - If symptoms persist I would recommend an EMG left upper extremity.    2.  PHYSICAL THERAPY: Patient is currently in physical therapy.  He has had 4 sessions so far.  Encouraged to begin with physical therapy and the home exercises.    3.  MEDICATIONS: No changes are made to the patient's medications.  - He can take ibuprofen as needed.  - We briefly discussed gabapentin.  We could consider this if symptoms persist.    4.  INTERVENTIONS: No interventions were ordered today.  Will await the results of his MRI.  If this shows left C6 impingement I recommend a left C5-6 transforaminal epidural steroid injection.    5.  PATIENT EDUCATION: Patient is in agreement the above plan.  All questions were answered.    6.  FOLLOW-UP:   My nurse will call the patient with the results of his MRI.  At that time I will likely recommend a left C5-6 transforaminal epidural steroid injection.  If he has questions or concerns in the meantime, he should not hesitate to  call.        SUBJECTIVE:  Brayden Peres  Is a 48 year old male who presents today in consultation at request of Elliot Edmond CNP for new patient evaluation of neck pain with radiation into the left upper extremity with associated numbness, tingling, weakness.  Patient reports that he has a history of left biceps tendinitis due to playing baseball.  Last spring he felt a flareup of that pain.  Then he painted his house in early July.  He states pain was severe after that.  States it felt like his arm was going to fall off.  1 to 2 weeks later he experienced new numbness and shooting pain down the arm which he has never had with his prior biceps tendinitis flareups.  He developed constant numbness in the left thumb.  He had difficulty sleeping due to the severity of the pain.  He was seen at his primary care clinic.  They referred him to physical therapy.  Physical therapy has been somewhat helpful.  Numbness and tingling is no longer constant, but he still has significant pain with certain neck positions and intermittent numbness and tingling as well as weakness.    Patient complains of left-sided neck pain.  Pain radiates into the left shoulder.  Down the lateral and biceps upper arm, extending into the extensor/radial forearm, ending in the left thumb.  He has intermittent numbness and tingling left thumb which sometimes affects the index finger.  He feels weak with .  He is left-handed.  He can trigger the symptoms by extending his neck to the left.  Pain is also aggravated with changing close and reaching down.  Pain is alleviated with keeping the neck in a neutral position and tilting his neck to the right.  He denies headaches associated with the pain.  Denies right-sided symptoms.    Treatment today:  - Current physical therapy, 4 sessions so far  - No chiropractor treatment  - No spine injections  - No spine surgeries  - Ibuprofen 600 mg somewhat helpful    Current Outpatient Medications    Medication Sig Dispense Refill    atorvastatin (LIPITOR) 40 MG tablet Take 1 tablet (40 mg) by mouth daily. 90 tablet 2    zolpidem (AMBIEN) 5 MG tablet 1 at bedtime as needed. 30 tablet 0     No current facility-administered medications for this visit.       No Known Allergies      Patient Active Problem List   Diagnosis    Insomnia, unspecified type    Family history of prostate cancer    Family history of coronary arteriosclerosis    Overweight (BMI 25.0-29.9)    Uncomplicated alcohol dependence (H)    Elevated coronary artery calcium score    Controlled substance agreement signed    Ganglion cyst    Neck pain    Chronic left shoulder pain       Past Surgical History:   Procedure Laterality Date    REMOVAL OF SPERM DUCT(S)      Description: Surgery Of Male Genitalia Vasectomy;  Recorded: 11/13/2013;    [facility] APPENDECTOMY      Description: Appendectomy;  Recorded: 08/30/2012;       Family History   Problem Relation Age of Onset    Alzheimer Disease Mother     Alzheimer Disease Father     Prostate Cancer Father     Heart Disease Father     Cerebrovascular Disease Paternal Grandmother        Social history: Patient is .  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  Denies tobacco use.  Denies recreational drug use.    ROS: Positive for ringing in ears, diarrhea, insomnia, anxiety.  Specifically negative for dysphagia, imbalance, fine motor skill difficulties, bowel/bladder dysfunction, fevers,chills, appetite changes, unexplained weight loss.   Otherwise 13 systems reviewed are negative.  Please see the patient's intake questionnaire from today for details.      OBJECTIVE:  PHYSICAL EXAMINATION:  CONSTITUTIONAL:  Vital signs as above.  No acute distress.  The patient is well nourished and well groomed.  PSYCHIATRIC:  The patient is awake, alert, oriented to person, place, time and answering questions appropriately with clear speech.    HEENT:   Normocephalic, atraumatic.  Sclera clear.    SKIN:  Skin over the face, bilateral upper extremities, and neck is clean, dry, intact without rashes.  LYMPH NODES:  No palpable or tender anterior/posterior cervical, submandibular, or supraclavicular lymph nodes.    MUSCLE STRENGTH:  5/5 strength for the bilateral shoulder abductors, elbow flexors/extensors, wrist extensors, finger flexors/abductors.  NEURO:  CN III-XII are grossly intact.  2+ symmetric biceps, brachioradialis, triceps reflexes bilaterally.  Sensation to light touch intact bilateral upper extremities throughout.  Negative Alvarado's bilaterally.    VASCULAR:  2/4 radial pulses bilaterally.  Warm upper limbs bilaterally.  Capillary refill in the upper extremities is less than 1 second.  MUSCULOSKELETAL: No tenderness to palpation cervical paraspinous muscles or upper trapezius muscles.  Cervical range of motion is mildly restricted with lateral rotation left.  Negative Spurling sign left.

## 2024-10-29 ENCOUNTER — TELEPHONE (OUTPATIENT)
Dept: PHYSICAL MEDICINE AND REHAB | Facility: CLINIC | Age: 49
End: 2024-10-29

## 2024-10-29 ENCOUNTER — OFFICE VISIT (OUTPATIENT)
Dept: PHYSICAL MEDICINE AND REHAB | Facility: CLINIC | Age: 49
End: 2024-10-29
Payer: COMMERCIAL

## 2024-10-29 VITALS
HEIGHT: 67 IN | DIASTOLIC BLOOD PRESSURE: 69 MMHG | TEMPERATURE: 98.4 F | SYSTOLIC BLOOD PRESSURE: 132 MMHG | WEIGHT: 174.9 LBS | BODY MASS INDEX: 27.45 KG/M2 | HEART RATE: 75 BPM

## 2024-10-29 DIAGNOSIS — M54.12 CERVICAL RADICULOPATHY: Primary | ICD-10-CM

## 2024-10-29 DIAGNOSIS — F40.240 CLAUSTROPHOBIA: Primary | ICD-10-CM

## 2024-10-29 PROCEDURE — 99203 OFFICE O/P NEW LOW 30 MIN: CPT | Performed by: PHYSICIAN ASSISTANT

## 2024-10-29 RX ORDER — DIAZEPAM 5 MG/1
TABLET ORAL
Qty: 2 TABLET | Refills: 0 | Status: SHIPPED | OUTPATIENT
Start: 2024-10-29

## 2024-10-29 ASSESSMENT — PAIN SCALES - GENERAL: PAINLEVEL_OUTOF10: MILD PAIN (3)

## 2024-10-29 NOTE — PATIENT INSTRUCTIONS
[facility] Scheduling    Please call [phone] to schedule your image(s) (select option#1).

## 2024-10-29 NOTE — TELEPHONE ENCOUNTER
Who's calling:   Patient             Family/Other name:  \   On C2C: yes or No: N/A       Providers name/other:    KATINA  Reason for call:  MEDICATION QUESTIONS/ REFILLS    Detailed Message: Melba Edwards placed MRI order today, patient is requesting for sedation for this MRI. He will be calling the imaging department to set up his appointment.  Please call patient to discuss sedation questions if needed      Call back #: [phone]  Preferred Pharmacy:   [id number] [place] [address] [address] AT [facility] [address]

## 2024-10-29 NOTE — LETTER
10/29/2024      Brayden Peres  [address] [address]      Dear Colleague,    Thank you for referring your patient, Brayden Peres, to the [facility]. Please see a copy of my visit note below.    ASSESSMENT: Brayden Peres is a 48 year old male with past medical history significant for insomnia, alcohol dependence who presents today for new patient evaluation of a greater than 3-month history of pain radiating down the left upper extremity with associated numbness, tingling, weakness.  Symptoms are persistent despite conservative treatment including NSAIDs and physical therapy.  Patient has not had any imaging of the cervical spine.  Suspect he has a disc bulge or protrusion with left C6 nerve root compromise.  Patient was neurologically intact on exam.        PLAN:  A shared decision making model was used.  The patient's values and choices were respected.  The following represents what was discussed and decided upon by the physician assistant and the patient.      1.  DIAGNOSTIC TESTS:    - I ordered and MRI cervical spine for further evaluation.  Patient has had symptoms for more than 3 months.  They are persistent despite conservative treatment.  - If symptoms persist I would recommend an EMG left upper extremity.    2.  PHYSICAL THERAPY: Patient is currently in physical therapy.  He has had 4 sessions so far.  Encouraged to begin with physical therapy and the home exercises.    3.  MEDICATIONS: No changes are made to the patient's medications.  - He can take ibuprofen as needed.  - We briefly discussed gabapentin.  We could consider this if symptoms persist.    4.  INTERVENTIONS: No interventions were ordered today.  Will await the results of his MRI.  If this shows left C6 impingement I recommend a left C5-6 transforaminal epidural steroid injection.    5.  PATIENT EDUCATION: Patient is in agreement the above plan.  All questions were answered.    6.   FOLLOW-UP:   My nurse will call the patient with the results of his MRI.  At that time I will likely recommend a left C5-6 transforaminal epidural steroid injection.  If he has questions or concerns in the meantime, he should not hesitate to call.        SUBJECTIVE:  Brayden Peres  Is a 48 year old male who presents today in consultation at request of Elliot Edmond CNP for new patient evaluation of neck pain with radiation into the left upper extremity with associated numbness, tingling, weakness.  Patient reports that he has a history of left biceps tendinitis due to playing baseball.  Last spring he felt a flareup of that pain.  Then he painted his house in early July.  He states pain was severe after that.  States it felt like his arm was going to fall off.  1 to 2 weeks later he experienced new numbness and shooting pain down the arm which he has never had with his prior biceps tendinitis flareups.  He developed constant numbness in the left thumb.  He had difficulty sleeping due to the severity of the pain.  He was seen at his primary care clinic.  They referred him to physical therapy.  Physical therapy has been somewhat helpful.  Numbness and tingling is no longer constant, but he still has significant pain with certain neck positions and intermittent numbness and tingling as well as weakness.    Patient complains of left-sided neck pain.  Pain radiates into the left shoulder.  Down the lateral and biceps upper arm, extending into the extensor/radial forearm, ending in the left thumb.  He has intermittent numbness and tingling left thumb which sometimes affects the index finger.  He feels weak with .  He is left-handed.  He can trigger the symptoms by extending his neck to the left.  Pain is also aggravated with changing close and reaching down.  Pain is alleviated with keeping the neck in a neutral position and tilting his neck to the right.  He denies headaches associated with the pain.  Denies  right-sided symptoms.    Treatment today:  - Current physical therapy, 4 sessions so far  - No chiropractor treatment  - No spine injections  - No spine surgeries  - Ibuprofen 600 mg somewhat helpful    Current Outpatient Medications   Medication Sig Dispense Refill     atorvastatin (LIPITOR) 40 MG tablet Take 1 tablet (40 mg) by mouth daily. 90 tablet 2     zolpidem (AMBIEN) 5 MG tablet 1 at bedtime as needed. 30 tablet 0     No current facility-administered medications for this visit.       No Known Allergies      Patient Active Problem List   Diagnosis     Insomnia, unspecified type     Family history of prostate cancer     Family history of coronary arteriosclerosis     Overweight (BMI 25.0-29.9)     Uncomplicated alcohol dependence (H)     Elevated coronary artery calcium score     Controlled substance agreement signed     Ganglion cyst     Neck pain     Chronic left shoulder pain       Past Surgical History:   Procedure Laterality Date     REMOVAL OF SPERM DUCT(S)      Description: Surgery Of Male Genitalia Vasectomy;  Recorded: 11/13/2013;     [facility] APPENDECTOMY      Description: Appendectomy;  Recorded: 08/30/2012;       Family History   Problem Relation Age of Onset     Alzheimer Disease Mother      Alzheimer Disease Father      Prostate Cancer Father      Heart Disease Father      Cerebrovascular Disease Paternal Grandmother        Social history: Patient is .  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  Denies tobacco use.  Denies recreational drug use.    ROS: Positive for ringing in ears, diarrhea, insomnia, anxiety.  Specifically negative for dysphagia, imbalance, fine motor skill difficulties, bowel/bladder dysfunction, fevers,chills, appetite changes, unexplained weight loss.   Otherwise 13 systems reviewed are negative.  Please see the patient's intake questionnaire from today for details.      OBJECTIVE:  PHYSICAL  EXAMINATION:  CONSTITUTIONAL:  Vital signs as above.  No acute distress.  The patient is well nourished and well groomed.  PSYCHIATRIC:  The patient is awake, alert, oriented to person, place, time and answering questions appropriately with clear speech.    HEENT:  Normocephalic, atraumatic.  Sclera clear.    SKIN:  Skin over the face, bilateral upper extremities, and neck is clean, dry, intact without rashes.  LYMPH NODES:  No palpable or tender anterior/posterior cervical, submandibular, or supraclavicular lymph nodes.    MUSCLE STRENGTH:  5/5 strength for the bilateral shoulder abductors, elbow flexors/extensors, wrist extensors, finger flexors/abductors.  NEURO:  CN III-XII are grossly intact.  2+ symmetric biceps, brachioradialis, triceps reflexes bilaterally.  Sensation to light touch intact bilateral upper extremities throughout.  Negative Alvarado's bilaterally.    VASCULAR:  2/4 radial pulses bilaterally.  Warm upper limbs bilaterally.  Capillary refill in the upper extremities is less than 1 second.  MUSCULOSKELETAL: No tenderness to palpation cervical paraspinous muscles or upper trapezius muscles.  Cervical range of motion is mildly restricted with lateral rotation left.  Negative Spurling sign left.        Again, thank you for allowing me to participate in the care of your patient.        Sincerely,        Melba Edwards PA-C

## 2024-10-30 ENCOUNTER — THERAPY VISIT (OUTPATIENT)
Dept: PHYSICAL THERAPY | Facility: REHABILITATION | Age: 49
End: 2024-10-30
Payer: COMMERCIAL

## 2024-10-30 DIAGNOSIS — M25.512 CHRONIC LEFT SHOULDER PAIN: ICD-10-CM

## 2024-10-30 DIAGNOSIS — G89.29 CHRONIC LEFT SHOULDER PAIN: ICD-10-CM

## 2024-10-30 DIAGNOSIS — M54.2 NECK PAIN: Primary | ICD-10-CM

## 2024-10-30 PROCEDURE — 97140 MANUAL THERAPY 1/> REGIONS: CPT | Mod: GP | Performed by: PHYSICAL THERAPIST

## 2024-10-30 PROCEDURE — 97112 NEUROMUSCULAR REEDUCATION: CPT | Mod: GP | Performed by: PHYSICAL THERAPIST

## 2024-10-30 PROCEDURE — 97110 THERAPEUTIC EXERCISES: CPT | Mod: GP | Performed by: PHYSICAL THERAPIST

## 2024-11-15 ENCOUNTER — HOSPITAL ENCOUNTER (OUTPATIENT)
Dept: MRI IMAGING | Facility: HOSPITAL | Age: 49
Discharge: HOME | End: 2024-11-15
Attending: PHYSICIAN ASSISTANT | Admitting: PHYSICIAN ASSISTANT
Payer: COMMERCIAL

## 2024-11-15 DIAGNOSIS — M54.12 CERVICAL RADICULOPATHY: ICD-10-CM

## 2024-11-15 PROCEDURE — 72141 MRI NECK SPINE W/O DYE: CPT

## 2024-11-18 ENCOUNTER — TELEPHONE (OUTPATIENT)
Dept: PHYSICAL MEDICINE AND REHAB | Facility: CLINIC | Age: 49
End: 2024-11-18
Payer: COMMERCIAL

## 2024-11-18 DIAGNOSIS — M54.12 CERVICAL RADICULOPATHY: Primary | ICD-10-CM

## 2024-11-18 NOTE — TELEPHONE ENCOUNTER
Phone call to patient to review results and provider's recommendations. Results given and explained. Offered patient the recommended injection or return to discuss further with PSP. Patient wants to move forward with the injection. Order placed in Epic for left C5-6 transforaminal epidural steroid injection. Injection requirements reviewed in full with patient. Stated understanding. Transferred to scheduling to make appointment.

## 2024-11-18 NOTE — TELEPHONE ENCOUNTER
----- Message from Melba Edwards sent at 11/18/2024  7:23 AM CST -----  Please call this patient and let him know that I reviewed his MRI cervical spine.  This shows a disc bulge/bone spur at C5-6 causing severe narrowing around the nerve as it exits out of the spine on the left side which correlates with his pain.  I recommend a left C5-6 transforaminal epidural steroid injection.  I am also happy to see the patient back in the clinic if he prefers to review the results in person.

## 2024-11-30 ENCOUNTER — MYC REFILL (OUTPATIENT)
Dept: INTERNAL MEDICINE | Facility: CLINIC | Age: 49
End: 2024-11-30
Payer: COMMERCIAL

## 2024-11-30 DIAGNOSIS — G47.00 INSOMNIA, UNSPECIFIED TYPE: ICD-10-CM

## 2024-12-02 RX ORDER — ZOLPIDEM TARTRATE 5 MG/1
TABLET ORAL
Qty: 30 TABLET | Refills: 0 | Status: SHIPPED | OUTPATIENT
Start: 2024-12-02

## 2024-12-13 PROBLEM — M54.2 NECK PAIN: Status: RESOLVED | Noted: 2024-10-01 | Resolved: 2024-12-13

## 2024-12-13 PROBLEM — M25.512 CHRONIC LEFT SHOULDER PAIN: Status: RESOLVED | Noted: 2024-10-01 | Resolved: 2024-12-13

## 2024-12-13 PROBLEM — G89.29 CHRONIC LEFT SHOULDER PAIN: Status: RESOLVED | Noted: 2024-10-01 | Resolved: 2024-12-13

## 2025-01-03 NOTE — PROGRESS NOTES
Assessment:   Brayden Peres is a 49 year old y.o. male with past medical history significant for insomnia, alcohol dependence who presents today for follow-up regarding pain rating down the left upper extremity with associated numbness, tingling, weakness.  My review of an MRI cervical spine shows multilevel spondylosis most pronounced at C5-6 where there is a disc osteophyte contributing to severe left foraminal stenosis.  Patient is status post a left C5-6 transforaminal epidural steroid injection December 6, 2024 which did not provide any relief of pain.       Plan:     A shared decision making plan was used.  The patient's values and choices were respected.  The following represents what was discussed and decided upon by the physician assistant and the patient.      1.  DIAGNOSTIC TESTS:  - I ordered cervical spine x-rays including flexion and extension views in preparation for her surgical consultation.  - I reviewed the MRI cervical spine.      2.  PHYSICAL THERAPY: Patient completed 6 sessions of physical therapy December 12, 2024.  Encouraged to continue with home exercises.    3.  MEDICATIONS: No changes are made to the patient's medications.  We briefly discussed trialing gabapentin.  Patient does not wish to pursue this at this time.    4.  INTERVENTIONS: No additional interventions were ordered.  I do not think there is another injection that is likely to be more effective than the left C5-6 transforaminal epidural steroid injection.    5.  REFERRALS: I entered a referral to neurosurgery.  Patient was the other Dr. Farah or Dr. Wilkes as he may benefit from an anterior cervical approach and he would like to have surgery done at [facility].    6.  FOLLOW-UP: Patient will follow-up with me as needed.  Will await recommendations from neurosurgery.  If he has questions or concerns, he should not hesitate to call.    Subjective:     Brayden Peres is a 49 year old male who presents today for  follow-up regarding left upper extremity pain with associated numbness, tingling, weakness.  Patient is following up after a left C5-6 transforaminal epidural steroid injection December 6, 2024.  Patient reports no improvement in symptoms.    Patient complains of left-sided neck pain.  Pain radiates into the left shoulder, down the left biceps, into the left radial forearm, ending in the left thumb.  He has numbness and tingling in the same distribution as his pain which sometimes extends into the left index finger as well.  He reports weakness in the left arm.  He is left-handed.  Denies loss of bowel or bladder control.  Denies fevers.  He rates his pain today as a 3 out of 10.  At its worst it is a 4 out of 10.  At his best it is a 1 out of 10.  Pain is aggravated with cervical extension, turning his head to the left, and sitting with poor posture.  Pain is alleviated with keeping his neck in a neutral position.  Denies any new symptoms since he was last seen.    Treatment to date:  - Completed 6 sessions of physical therapy December 12, 2024  - Left C5-6 transforaminal epidural steroid injection December 6, 2024 with no relief  - Ibuprofen    Review of Systems:  Positive for numbness/tingling, weakness.  Negative for loss of bowel/bladder control, inability to urinate, headache, pain much worse at night, trip/stumble/falls, difficulty swallowing, difficulty with hand skills, fevers, unintentional weight loss.     Objective:   CONSTITUTIONAL:  Vital signs as above.  No acute distress.  The patient is well nourished and well groomed.    PSYCHIATRIC:  The patient is awake, alert, oriented to person, place and time.  The patient is answering questions appropriately with clear speech.  Normal affect.  HEENT: Normocephalic, atraumatic.  Sclera clear.    LYMPH: No cervical lymphadenopathy  SKIN:  Skin over the face, neck bilateral upper extremities is clean, dry, intact without rashes.  MUSCULOSKELETAL: The patient has  5/5 strength for the bilateral shoulder abductors, elbow flexors/extensors, wrist extensors, finger flexors/abductors.   NEUROLOGICAL: 2+ biceps, brachioradialis, triceps reflexes bilaterally.  Negative Alvarado's bilaterally.  Sensation to light touch is diminished left C6 dermatome.    RESULTS: I reviewed the MRI cervical spine dated November 15, 2024.  This shows multilevel spondylosis most pronounced at C5-6 where there is a left eccentric disc osteophyte with mild spinal canal stenosis, severe left and mild right foraminal stenosis.

## 2025-01-07 ENCOUNTER — OFFICE VISIT (OUTPATIENT)
Dept: PHYSICAL MEDICINE AND REHAB | Facility: CLINIC | Age: 50
End: 2025-01-07
Payer: COMMERCIAL

## 2025-01-07 VITALS — DIASTOLIC BLOOD PRESSURE: 71 MMHG | HEART RATE: 65 BPM | SYSTOLIC BLOOD PRESSURE: 135 MMHG

## 2025-01-07 DIAGNOSIS — M54.12 CERVICAL RADICULOPATHY: Primary | ICD-10-CM

## 2025-01-07 PROCEDURE — 99213 OFFICE O/P EST LOW 20 MIN: CPT | Performed by: PHYSICIAN ASSISTANT

## 2025-01-07 ASSESSMENT — PAIN SCALES - GENERAL: PAINLEVEL_OUTOF10: MILD PAIN (3)

## 2025-01-07 NOTE — PATIENT INSTRUCTIONS
[facility] Scheduling    Please call [phone] to schedule your image(s) (select option#1). There are 3 different locations, see below. You can do walk-in visits for xray only images if you want.     [facility]  [address] [phone]    [facility]  [address] [phone]    [facility] [place]  [address], [address]  [place] [address]     A referral was entered to see [facility]surgery at [facility].  They will call you to schedule an appointment.  Ifyou do not hear from them within 72 hrs, please call [phone] to schedule an appointment.

## 2025-01-07 NOTE — TELEPHONE ENCOUNTER
NEUROSURGERY - NEW PREVISIT PLANNING    Referring Provider: Melba Edwards PA-C in [facility]   OVN 1/7/2025   Reason For Visit: Cervical radiculopathy [M54.12]   DIRECT REFERRAL: to see Dr. Wilkes or Dr. Farah         IMAGING     MRI INTERNAL/IMAGING 11/15/2024   MR CERVICAL SPINE W/O CONTRAST   CT NA    XRAY INTERNAL/IMAGING 1/9/2025  XR CERVICAL SPINE COMPLETE W OBL & FLEX/EXT   NOTES     Other specialist OVN: NA    EMG NA    INJECTION INTERNAL 12/6/2024  left C5-6 transforaminal epidural steroid injection   PHYSICAL THERAPY INTERNAL 10/30/2024   SURGERY NA

## 2025-01-07 NOTE — LETTER
1/7/2025      Brayden Peres  [address] [address]      Dear Colleague,    Thank you for referring your patient, Brayden Peres, to the [facility]. Please see a copy of my visit note below.    Assessment:   Brayden Peres is a 49 year old y.o. male with past medical history significant for insomnia, alcohol dependence who presents today for follow-up regarding pain rating down the left upper extremity with associated numbness, tingling, weakness.  My review of an MRI cervical spine shows multilevel spondylosis most pronounced at C5-6 where there is a disc osteophyte contributing to severe left foraminal stenosis.  Patient is status post a left C5-6 transforaminal epidural steroid injection December 6, 2024 which did not provide any relief of pain.       Plan:     A shared decision making plan was used.  The patient's values and choices were respected.  The following represents what was discussed and decided upon by the physician assistant and the patient.      1.  DIAGNOSTIC TESTS:  - I ordered cervical spine x-rays including flexion and extension views in preparation for her surgical consultation.  - I reviewed the MRI cervical spine.      2.  PHYSICAL THERAPY: Patient completed 6 sessions of physical therapy December 12, 2024.  Encouraged to continue with home exercises.    3.  MEDICATIONS: No changes are made to the patient's medications.  We briefly discussed trialing gabapentin.  Patient does not wish to pursue this at this time.    4.  INTERVENTIONS: No additional interventions were ordered.  I do not think there is another injection that is likely to be more effective than the left C5-6 transforaminal epidural steroid injection.    5.  REFERRALS: I entered a referral to neurosurgery.  Patient was the other Dr. Farah or Dr. Wilkes as he may benefit from an anterior cervical approach and he would like to have surgery done at [facility].    6.   FOLLOW-UP: Patient will follow-up with me as needed.  Will await recommendations from neurosurgery.  If he has questions or concerns, he should not hesitate to call.    Subjective:     Brayden Peres is a 49 year old male who presents today for follow-up regarding left upper extremity pain with associated numbness, tingling, weakness.  Patient is following up after a left C5-6 transforaminal epidural steroid injection December 6, 2024.  Patient reports no improvement in symptoms.    Patient complains of left-sided neck pain.  Pain radiates into the left shoulder, down the left biceps, into the left radial forearm, ending in the left thumb.  He has numbness and tingling in the same distribution as his pain which sometimes extends into the left index finger as well.  He reports weakness in the left arm.  He is left-handed.  Denies loss of bowel or bladder control.  Denies fevers.  He rates his pain today as a 3 out of 10.  At its worst it is a 4 out of 10.  At his best it is a 1 out of 10.  Pain is aggravated with cervical extension, turning his head to the left, and sitting with poor posture.  Pain is alleviated with keeping his neck in a neutral position.  Denies any new symptoms since he was last seen.    Treatment to date:  - Completed 6 sessions of physical therapy December 12, 2024  - Left C5-6 transforaminal epidural steroid injection December 6, 2024 with no relief  - Ibuprofen    Review of Systems:  Positive for numbness/tingling, weakness.  Negative for loss of bowel/bladder control, inability to urinate, headache, pain much worse at night, trip/stumble/falls, difficulty swallowing, difficulty with hand skills, fevers, unintentional weight loss.     Objective:   CONSTITUTIONAL:  Vital signs as above.  No acute distress.  The patient is well nourished and well groomed.    PSYCHIATRIC:  The patient is awake, alert, oriented to person, place and time.  The patient is answering questions appropriately with  clear speech.  Normal affect.  HEENT: Normocephalic, atraumatic.  Sclera clear.    LYMPH: No cervical lymphadenopathy  SKIN:  Skin over the face, neck bilateral upper extremities is clean, dry, intact without rashes.  MUSCULOSKELETAL: The patient has 5/5 strength for the bilateral shoulder abductors, elbow flexors/extensors, wrist extensors, finger flexors/abductors.   NEUROLOGICAL: 2+ biceps, brachioradialis, triceps reflexes bilaterally.  Negative Alvarado's bilaterally.  Sensation to light touch is diminished left C6 dermatome.    RESULTS: I reviewed the MRI cervical spine dated November 15, 2024.  This shows multilevel spondylosis most pronounced at C5-6 where there is a left eccentric disc osteophyte with mild spinal canal stenosis, severe left and mild right foraminal stenosis.         Again, thank you for allowing me to participate in the care of your patient.        Sincerely,        Melab Edwards PA-C    Electronically signed

## 2025-01-09 ENCOUNTER — TELEPHONE (OUTPATIENT)
Dept: NEUROSURGERY | Facility: CLINIC | Age: 50
End: 2025-01-09

## 2025-01-09 ENCOUNTER — OFFICE VISIT (OUTPATIENT)
Dept: NEUROSURGERY | Facility: CLINIC | Age: 50
End: 2025-01-09
Attending: PHYSICIAN ASSISTANT
Payer: COMMERCIAL

## 2025-01-09 ENCOUNTER — PREP FOR PROCEDURE (OUTPATIENT)
Dept: NEUROLOGY | Facility: CLINIC | Age: 50
End: 2025-01-09

## 2025-01-09 ENCOUNTER — PRE VISIT (OUTPATIENT)
Dept: NEUROSURGERY | Facility: CLINIC | Age: 50
End: 2025-01-09

## 2025-01-09 VITALS
HEART RATE: 53 BPM | OXYGEN SATURATION: 94 % | WEIGHT: 182.1 LBS | BODY MASS INDEX: 28.58 KG/M2 | SYSTOLIC BLOOD PRESSURE: 132 MMHG | DIASTOLIC BLOOD PRESSURE: 75 MMHG | HEIGHT: 67 IN

## 2025-01-09 DIAGNOSIS — M54.12 CERVICAL RADICULOPATHY: Primary | ICD-10-CM

## 2025-01-09 PROCEDURE — 99203 OFFICE O/P NEW LOW 30 MIN: CPT | Performed by: SURGERY

## 2025-01-09 ASSESSMENT — PAIN SCALES - GENERAL: PAINLEVEL_OUTOF10: MODERATE PAIN (4)

## 2025-01-09 NOTE — LETTER
1/9/2025      Brayden Peres  [address]  [place] [facility] [address]      Dear Colleague,    Thank you for referring your patient, Brayden Peres, to the [facility]UR[facility]. Please see a copy of my visit note below.    NEUROSURGERY CONSULTATION NOTE      Neurosurgery was asked to see this patient by Melba Edwards PA-C for evaluation of cervical radiculopathy.       CONSULTATION ASSESSMENT AND PLAN:    48 yo male who presents with left numbness, tingling and weakness. Cervical xray shows no subluxations of dynamic instability. Cervical MRI shows mild canal stenosis with severe foraminal narrowing on left at cervical 5-6. He has less significant stenosis elsewhere. Recommend left cervical 5-cervical 6 hemilaminectomy and foraminotomy with possible microdiscectomy. Risks and benefits of surgery discussed including but not limited to infection, hematoma, nerve damage including paralysis, post op radiculitis, durotomy, inadequate symptom relief, recurrent cervical stenosis or narrowing, risks associated with the use of general anesthesia.     No further conservative care is indicated and the surgery is medically necessary for the following reasons:  Further physical therapy is not indicated in this case as it would only prolong the patient s suffering without providing any benefit and the delay would increase the risk of neurologic decline and/or symptoms worsening unnecessarily, with no benefit to the patient and possible harm.    There are no untreated, underlying mental health conditions (including but not limited to psychological conditions or drug or alcohol abuse) that will preclude an appropriate recovery from this surgery or that are contraindications to proceeding with surgery.     Brittani Wilkes MD      HISTORY OF PRESENTING ILLNESS:    48 yo male who presents with left numbness, tingling and weakness. Chronic symptoms with worsening last summer. Symptoms travel down his  shoulder and into his bicep and forearm and into his thumb. Extension with rotation to the left side will cause worsening and numbness and feels weak. No right arm symptoms. More generalized discomfort then pain. Denies bowel or bladder changes or imbalance.     Avoiding extension or rotation of the left helps his symptoms.   Physical therapy also did help his symptoms but did not resolve.  Symptoms previously constant now intermittent but still bothersome.   Left cervical 5-6 TFESI on 12/6/24- no relief.   Ibuprofen without resolution of symptoms.       No past medical history on file.    Past Surgical History:   Procedure Laterality Date     REMOVAL OF SPERM DUCT(S)      Description: Surgery Of Male Genitalia Vasectomy;  Recorded: 11/13/2013;     [facility] APPENDECTOMY      Description: Appendectomy;  Recorded: 08/30/2012;       REVIEW OF SYSTEMS:  See ROS form under media     MEDICATIONS:    Current Outpatient Medications   Medication Sig Dispense Refill     atorvastatin (LIPITOR) 40 MG tablet Take 1 tablet (40 mg) by mouth daily. 90 tablet 2     zolpidem (AMBIEN) 5 MG tablet 1 at bedtime as needed. 30 tablet 0     diazepam (VALIUM) 5 MG tablet Take 1 tab 2 hrs before MRI, take 1 tab 1 hr before MRI only if needed (Patient not taking: Reported on 1/9/2025) 2 tablet 0         ALLERGIES/SENSITIVITIES:     No Known Allergies    PERTINENT SOCIAL HISTORY:   Social History     Socioeconomic History     Marital status:      Spouse name: None     Number of children: None     Years of education: None     Highest education level: None   Tobacco Use     Smoking status: Former     Current packs/day: 0.50     Types: Cigarettes     Passive exposure: Never     Smokeless tobacco: Never   Vaping Use     Vaping status: Never Used     Social Drivers of Health     Financial Resource Strain: Low Risk  (7/9/2024)    Financial Resource Strain      Within the past 12 months, have you or your family members you live with been unable to  "get utilities (heat, electricity) when it was really needed?: No   Food Insecurity: Low Risk  (7/9/2024)    Food Insecurity      Within the past 12 months, did you worry that your food would run out before you got money to buy more?: No      Within the past 12 months, did the food you bought just not last and you didn t have money to get more?: No   Transportation Needs: Low Risk  (7/9/2024)    Transportation Needs      Within the past 12 months, has lack of transportation kept you from medical appointments, getting your medicines, non-medical meetings or appointments, work, or from getting things that you need?: No   Physical Activity: Unknown (7/9/2024)    Exercise Vital Sign      Days of Exercise per Week: 7 days   Stress: Stress Concern Present (7/9/2024)    [employer] - Occupational Stress Questionnaire      Feeling of Stress : To some extent   Social Connections: Unknown (7/9/2024)    Social Connection and Isolation Panel [NHANES]      Frequency of Social Gatherings with Friends and Family: Once a week   Interpersonal Safety: Low Risk  (7/9/2024)    Interpersonal Safety      Do you feel physically and emotionally safe where you currently live?: Yes      Within the past 12 months, have you been hit, slapped, kicked or otherwise physically hurt by someone?: No      Within the past 12 months, have you been humiliated or emotionally abused in other ways by your partner or ex-partner?: No   Housing Stability: Low Risk  (7/9/2024)    Housing Stability      Do you have housing? : Yes      Are you worried about losing your housing?: No         FAMILY HISTORY:  Family History   Problem Relation Age of Onset     Alzheimer Disease Mother      Alzheimer Disease Father      Prostate Cancer Father      Heart Disease Father      Cerebrovascular Disease Paternal Grandmother         PHYSICAL EXAM:   Constitutional: /75   Pulse 53   Ht 1.702 m (5' 7\")   Wt 82.6 kg (182 lb 1.6 oz)   SpO2 " 94%   BMI 28.52 kg/m       Mental Status: A & O in no acute distress.  Affect is appropriate.  Speech is fluent.  Recent and remote memory are intact.  Attention span and concentration are normal.     Motor: Normal bulk and tone all muscle groups of upper and lower extremities.    Strength: 5/5 x 4    Sensory: Sensation intact bilaterally to light touch.     Coordination; Tndem gait intact.  Normal gait and station.     Reflexes; supinator, biceps, triceps, knee/ ankle jerk intact 2+. No carrillo's    IMAGING:  I personally reviewed all radiographic images         Cc:   Elliot Edmond             Again, thank you for allowing me to participate in the care of your patient.        Sincerely,        Brittani Wilkes MD    Electronically signed

## 2025-01-09 NOTE — NURSING NOTE
"Brayden Peres is a 49 year old male who presents for:  Chief Complaint   Patient presents with    Consult     Cervical radiculopathy - pain neck into the left shoulder and in into his thumb. Numbness in thumb when his neck is in certain positions. Pain lvl 4.        Initial Vitals:  /75   Pulse 53   Ht 5' 7\" (1.702 m)   Wt 182 lb 1.6 oz (82.6 kg)   SpO2 94%   BMI 28.52 kg/m   Estimated body mass index is 28.52 kg/m  as calculated from the following:    Height as of this encounter: 5' 7\" (1.702 m).    Weight as of this encounter: 182 lb 1.6 oz (82.6 kg).. Body surface area is 1.98 meters squared. BP completed using cuff size: regular  Moderate Pain (4)    Nursing Comments:       Charlene Aldana    "

## 2025-01-09 NOTE — TELEPHONE ENCOUNTER
Date: January 9, 2025    Provider: Dr. Chung Moreno MD     Provider/Other: b/a    Reason for out-going call: SURGERY SCHEDULED/ NEEDS TO SCHEDULE       Detailed message: spoke with patient to schedule surgery, patient stated he will call back and schedule           Number provider for patient: TUE DRWE : [phone]

## 2025-01-09 NOTE — PROGRESS NOTES
NEUROSURGERY CONSULTATION NOTE      Neurosurgery was asked to see this patient by Melba Edwards PA-C for evaluation of cervical radiculopathy.       CONSULTATION ASSESSMENT AND PLAN:    ***    Left cervical 5-cervical 6 hemilaminectomy and foraminotomy with possible microdiscectomy. Risks and benefits of surgery discussed including but not limited to infection, hematoma, nerve damage including paralysis, post op radiculitis, durotomy, inadequate symptom relief, recurrent cervical stenosis or narrowing, risks associated with the use of general anesthesia.       Brittani Wilkes MD      HISTORY OF PRESENTING ILLNESS:    50 yo male who presents with left numbness, tingling and weakness. Chronic symptoms with worsening last summer. Symptoms travel down his shoulder and into his bicep and forearm and into his thumb. Extension with rotation to the left side will cause worsening and numbness and feels weak. No right arm symptoms. More generalized discomfort then pain. Denies bowel or bladder changes or imbalance.     Avoiding extension or rotation of the left helps his symptoms.   Physical therapy also did help his symptoms but did not resolve.Symptoms previously constant now intermittent but still bothersome.   Left cervical 5-6 TFESI on 12/6/24 no relief.   Ibuprofen without resolution of symptoms.       No past medical history on file.    Past Surgical History:   Procedure Laterality Date    REMOVAL OF SPERM DUCT(S)      Description: Surgery Of Male Genitalia Vasectomy;  Recorded: 11/13/2013;    [facility] APPENDECTOMY      Description: Appendectomy;  Recorded: 08/30/2012;       REVIEW OF SYSTEMS:  See ROS form under media     MEDICATIONS:    Current Outpatient Medications   Medication Sig Dispense Refill    atorvastatin (LIPITOR) 40 MG tablet Take 1 tablet (40 mg) by mouth daily. 90 tablet 2    zolpidem (AMBIEN) 5 MG tablet 1 at bedtime as needed. 30 tablet 0    diazepam (VALIUM) 5 MG tablet Take 1 tab 2 hrs before MRI,  take 1 tab 1 hr before MRI only if needed (Patient not taking: Reported on 1/9/2025) 2 tablet 0         ALLERGIES/SENSITIVITIES:     No Known Allergies    PERTINENT SOCIAL HISTORY:   Social History     Socioeconomic History    Marital status:      Spouse name: None    Number of children: None    Years of education: None    Highest education level: None   Tobacco Use    Smoking status: Former     Current packs/day: 0.50     Types: Cigarettes     Passive exposure: Never    Smokeless tobacco: Never   Vaping Use    Vaping status: Never Used     Social Drivers of Health     Financial Resource Strain: Low Risk  (7/9/2024)    Financial Resource Strain     Within the past 12 months, have you or your family members you live with been unable to get utilities (heat, electricity) when it was really needed?: No   Food Insecurity: Low Risk  (7/9/2024)    Food Insecurity     Within the past 12 months, did you worry that your food would run out before you got money to buy more?: No     Within the past 12 months, did the food you bought just not last and you didn t have money to get more?: No   Transportation Needs: Low Risk  (7/9/2024)    Transportation Needs     Within the past 12 months, has lack of transportation kept you from medical appointments, getting your medicines, non-medical meetings or appointments, work, or from getting things that you need?: No   Physical Activity: Unknown (7/9/2024)    Exercise Vital Sign     Days of Exercise per Week: 7 days   Stress: Stress Concern Present (7/9/2024)    [employer] [place] [employer] - Occupational Stress Questionnaire     Feeling of Stress : To some extent   Social Connections: Unknown (7/9/2024)    Social Connection and Isolation Panel [NHANES]     Frequency of Social Gatherings with Friends and Family: Once a week   Interpersonal Safety: Low Risk  (7/9/2024)    Interpersonal Safety     Do you feel physically and emotionally safe where you currently live?: Yes  "    Within the past 12 months, have you been hit, slapped, kicked or otherwise physically hurt by someone?: No     Within the past 12 months, have you been humiliated or emotionally abused in other ways by your partner or ex-partner?: No   Housing Stability: Low Risk  (7/9/2024)    Housing Stability     Do you have housing? : Yes     Are you worried about losing your housing?: No         FAMILY HISTORY:  Family History   Problem Relation Age of Onset    Alzheimer Disease Mother     Alzheimer Disease Father     Prostate Cancer Father     Heart Disease Father     Cerebrovascular Disease Paternal Grandmother         PHYSICAL EXAM:   Constitutional: /75   Pulse 53   Ht 1.702 m (5' 7\")   Wt 82.6 kg (182 lb 1.6 oz)   SpO2 94%   BMI 28.52 kg/m       Mental Status: A & O in no acute distress.  Affect is appropriate.  Speech is fluent.  Recent and remote memory are intact.  Attention span and concentration are normal.     Motor: Normal bulk and tone all muscle groups of upper and lower extremities.    Strength: 5/5x4    Sensory: Sensation intact bilaterally to light touch.     Coordination; Tndem gait intact.  Normal gait and station.     Reflexes; supinator, biceps, triceps, knee/ ankle jerk intact 2+. No hoffmans    IMAGING:  I personally reviewed all radiographic images         Cc:   Elliot Edmond           " extremities.    Strength: 5/5 x 4    Sensory: Sensation intact bilaterally to light touch.     Coordination; Tndem gait intact.  Normal gait and station.     Reflexes; supinator, biceps, triceps, knee/ ankle jerk intact 2+. No carrillo's    IMAGING:  I personally reviewed all radiographic images         Cc:   Elliot Edmond

## 2025-01-09 NOTE — PATIENT INSTRUCTIONS
Recommend Left cervical 5-cervical 6 hemilaminectomy and foraminotomy. Risks and benefits of surgery discussed including but not limited to infection, hematoma, nerve damage including paralysis, post op radiculitis, durotomy, inadequate symptom relief, recurrent cervical stenosis or narrowing, risks associated with the use of general anesthesia.       Please review COMPLETE information about your proposed surgery, pre-operative requirements, post-operative course and expectations - available in a folder provided to you in clinic!    Your surgery scheduler will call you within 3 business days to begin the process of scheduling your surgery and appointments.     Pre-Operative    Pre-operative physical / Lab work with primary care physician within 30 days of surgical date. We prefer the pre-op exam to be done 2 weeks prior to surgery. We also prefer pre-op lab work be done at [facility] outpatient lab, 2 weeks prior to surgery.     If all pre-op appointments/test are not completed prior to your surgery date, you will be asked to reschedule your surgery.           As part of preparation for your upcoming procedure your primary care doctor may order a test to rule out a COVID-19 infection. This is no longer a requirement prior to surgery.     Readiness Visits    Prior to surgery, you may have a telephone or in person readiness visit with our RN team to discuss your upcomming surgery, results of your pre-op physical, and lab work.   If you will require a collar/neck brace after surgery, you will be fitted for one at your readiness visit prior to surgery (scheduled by the surgery scheduler).     Shower procedure    Hibiclens shower: Use one packet the night before surgery and one packet the morning of surgery for a whole body shower. Avoid face, hair, and genitals.      Eating/Drinking    Stop all solid foods 8 hours before surgery.  Stop all clear liquids 2 hours prior to arrival time     Clear  liquids include water, clear juice, black coffee, or clear tea without milk, Gatorade, clear soda.     Medications - please refer to the pre-operative medication instructions sheet in your folder    Hold Aspirin, NSAIDs (Advil/Ibuprofen, Indocin, Naproxen,Nuprin,Relafen/Nabumetone, Diclofenac,Meloxicam, Aleve, Celebrex) and all vitamins and supplements x 7-10 days prior to surgical date  You can take Tylenol (Acetaminophen) for pain up until the date of your surgery   Do not exceed 3,000 mg per day   Any other medications prescribed, please discuss with your primary care provider at your pre-operative physical. Please discuss when/if it is safe for you to stop taking blood thinners with your primary care provider.   We will NOT provide pain medications prior to surgery. We will prescribe post-op pain medications for up to 6 weeks after surgery.       FMLA/Short-term disability    If you are currently employed, you will likely need to be off work for 4-6 weeks for post-op recovery and healing.  Please fax any FMLA/short term disability paperwork to [phone], mail it into the clinic, drop it off in person, or send via a Skip[employer] message.   You may also call our clinic with the date in which you'd like to return to work, and we can provide a work letter to your employer  We will support Short-Term Disability up to 12 weeks, beginning the date of your surgery. We do NOT support Long-Term Disability/Social Security Benefits.     Pain Management after surgery    Dealing with pain    As your body heals, you might feel a stabbing, burning, or aching pain. You may also have some numbness.  Everyone feels pain differently, we may ask you to rate your pain using a pain scale. This will let us know how much pain you feel.   Keep in mind that medicine won't take away all of your pain. It helps to try other ways to relax and ease pain.     Things to help with pain    After surgery, we will give you medicine for your pain.  These medications work well, but they can make you drowsy, itchy, or sick to your stomach, and constipated. Try to take narcotics with food if they cause nausea.   For mild to moderate pain, you can take medication such as Tylenol or Ibuprofen. These can be used with narcotics and muscle relaxants. *If you have had a fusion: do NOT use NSAIDs for 6 months after surgery.   Do NOT drive while taking narcotic pain medication  Do NOT drink alcohol while using narcotic pain medication  You can utilize ice as needed (no longer than 20 minutes at one time) you may apply this over your covered incision  Utilize heat for muscle spasms, do not apply heat over your incision  If a muscle relaxer is prescribed, please do NOT take it at the same time as your narcotic pain medication. Take them at least 90 minutes apart.   You may also use pain cream/patches on sore muscles. Do NOT apply these on your incision. Patches may be cut in 1/2 if needed.     *After your surgery, if you will be staying in-patient, a nursing team will be monitoring you closely throughout your stay and communicate your health status to your surgeon and other providers.  You will be seen by Advanced Practice Providers (e.g., nurse practitioners, clinic nurse specialists, and physician assistants) who will check on you regularly to assess the status of your surgical recovery.     Incision Care    Look at your incision site every day. You  may need a mirror, or family member to help you.   Do not submerge your incision in water such as pools, hot tubs, baths for at least 6 weeks or until incision is healed  You may get your incision wet in the shower. Allow water and soap to run over incision, and gently pat dry.   Remove the dressing the day after you are discharged from the hospital. Keep the incision clean and dry at all times. This may require several bandage changes.   Contact us right away if you have:   Fever over 101 degrees farenheit  Green or yellow  drainage (pus) from your incision or increased bloody drainage   Redness, swelling, or warmth at your surgery site   Notify the clinic [phone].    Activity Restrictions    For the first 6 weeks, no lifting,pushing, or pulling > 5-10 pounds, no bending, twisting.  Use the stairs in moderation   Walking: Walking is the best way to start exercise after surgery. Take short frequent walks. You may gradually increase the distance as tolerated. If you feel pain, decrease your activity, but DO NOT stop walking. Walking will help you regain strength.  Avoid prolonged positioning for longer than 30 minutes (change positions frequently while awake)  No contact sports until after follow up visit  No high impact activities such as; running/jogging, snowmobile or 4 renee riding or any other recreational vehicles until deemed safe by your surgeon/care team.   Please call the clinic if you develop any of the following symptoms:  Swelling and/or warmth in one or both legs  Pain or tenderness in your leg, ankle, foot, or arm   Red or discolored/pale skin     Post-Op Follow Up Appointments    We will call you to schedule these appointments after your discharge from the hospital.   Incision assessment within 2 weeks with a Registered Nurse   6 week post-op with a Nurse Practitioner/Physician Assistant. Your surgeon will be available on this day.

## 2025-01-15 ENCOUNTER — TELEPHONE (OUTPATIENT)
Dept: NEUROSURGERY | Facility: CLINIC | Age: 50
End: 2025-01-15
Payer: COMMERCIAL

## 2025-01-21 ENCOUNTER — OFFICE VISIT (OUTPATIENT)
Dept: INTERNAL MEDICINE | Facility: CLINIC | Age: 50
End: 2025-01-21
Payer: COMMERCIAL

## 2025-01-21 VITALS
DIASTOLIC BLOOD PRESSURE: 76 MMHG | BODY MASS INDEX: 29.03 KG/M2 | RESPIRATION RATE: 16 BRPM | SYSTOLIC BLOOD PRESSURE: 120 MMHG | HEART RATE: 60 BPM | HEIGHT: 67 IN | OXYGEN SATURATION: 99 % | TEMPERATURE: 97.9 F | WEIGHT: 185 LBS

## 2025-01-21 DIAGNOSIS — R93.1 ELEVATED CORONARY ARTERY CALCIUM SCORE: ICD-10-CM

## 2025-01-21 DIAGNOSIS — Z01.818 PREOPERATIVE EXAMINATION: Primary | ICD-10-CM

## 2025-01-21 DIAGNOSIS — G47.00 INSOMNIA, UNSPECIFIED TYPE: ICD-10-CM

## 2025-01-21 DIAGNOSIS — M54.12 CERVICAL RADICULOPATHY: ICD-10-CM

## 2025-01-21 LAB
HGB BLD-MCNC: 14.6 G/DL (ref 13.3–17.7)
POTASSIUM SERPL-SCNC: 4.5 MMOL/L (ref 3.4–5.3)

## 2025-01-21 PROCEDURE — 99214 OFFICE O/P EST MOD 30 MIN: CPT | Performed by: NURSE PRACTITIONER

## 2025-01-21 PROCEDURE — 36415 COLL VENOUS BLD VENIPUNCTURE: CPT | Performed by: NURSE PRACTITIONER

## 2025-01-21 PROCEDURE — 85018 HEMOGLOBIN: CPT | Performed by: NURSE PRACTITIONER

## 2025-01-21 PROCEDURE — G2211 COMPLEX E/M VISIT ADD ON: HCPCS | Performed by: NURSE PRACTITIONER

## 2025-01-21 PROCEDURE — 84132 ASSAY OF SERUM POTASSIUM: CPT | Performed by: NURSE PRACTITIONER

## 2025-01-21 RX ORDER — ZOLPIDEM TARTRATE 5 MG/1
TABLET ORAL
Qty: 30 TABLET | Refills: 0 | Status: SHIPPED | OUTPATIENT
Start: 2025-01-21

## 2025-01-21 NOTE — PROGRESS NOTES
Preoperative Evaluation  [facility]  [address] [address]  Phone: [phone]  Fax: [phone]  Primary Provider: Elliot Edmond CNP  Pre-op Performing Provider: Elliot Edmond CNP  Jan 21, 2025 1/21/2025   Surgical Information   What procedure is being done? [id number] vertebrae   Facility or Hospital where procedure/surgery will be performed: [facility]   Who is doing the procedure / surgery? tabatha rea   Date of surgery / procedure: jan 29   Time of surgery / procedure: 11:00   Where do you plan to recover after surgery? at home with family     Fax number for surgical facility: Note does not need to be faxed, will be available electronically in Epic.    Assessment & Plan     The proposed surgical procedure is considered INTERMEDIATE risk.    Preoperative examination  No contraindications for planned procedure  - Potassium; Future  - Hemoglobin; Future    Cervical radiculopathy  He has trialed and failed conservative measures.  Plan is to move forward with a hemilaminectomy, foraminotomy, and possible microdiscectomy    Elevated coronary artery calcium score  No anginal symptoms. Continues on atorvastatin.  He is not on a baby aspirin    Insomnia, unspecified type  Controlled on zolpidem  - zolpidem (AMBIEN) 5 MG tablet; 1 at bedtime as needed.    Patient Instructions   Hold all supplements, aspirin and NSAIDs for 7 days prior to surgery.     Follow your surgeon's direction on when to stop eating and drinking prior to surgery.    Your surgeon will be managing your pain after your surgery.      Remove all jewelry and metal piercings before your surgery.     Remove nail polish from fingers before surgery.    If you use a CPAP machine, bring this with you to surgery.      The longitudinal plan of care for the diagnosis(es)/condition(s) as documented were addressed during this visit. Due to the added complexity in care, I will continue to  support Brayden in the subsequent management and with ongoing continuity of care.       - No identified additional risk factors other than previously addressed         Recommendation  Approval given to proceed with proposed procedure, without further diagnostic evaluation.    Subjective   Brayden is a 49 year old, presenting for the following:  Pre-Op Exam (Left cervical 5-cervical 6 hemilaminectomy and foraminotomy with possible microdiscectomy.)          1/21/2025     9:50 AM   Additional Questions   Roomed by Nancie ZAIDI related to upcoming procedure: As above        1/21/2025   Pre-Op Questionnaire   Have you ever had a heart attack or stroke? No   Have you ever had surgery on your heart or blood vessels, such as a stent placement, a coronary artery bypass, or surgery on an artery in your head, neck, heart, or legs? No   Do you have chest pain with activity? No   Do you have a history of heart failure? No   Do you currently have a cold, bronchitis or symptoms of other infection? No   Do you have a cough, shortness of breath, or wheezing? No   Do you or anyone in your family have previous history of blood clots? No   Do you or does anyone in your family have a serious bleeding problem such as prolonged bleeding following surgeries or cuts? No   Have you ever had problems with anemia or been told to take iron pills? No   Have you had any abnormal blood loss such as black, tarry or bloody stools? No   Have you ever had a blood transfusion? No   Are you willing to have a blood transfusion if it is medically needed before, during, or after your surgery? Yes   Have you or any of your relatives ever had problems with anesthesia? No   Do you have sleep apnea, excessive snoring or daytime drowsiness? No   Do you have any artifical heart valves or other implanted medical devices like a pacemaker, defibrillator, or continuous glucose monitor? No   Do you have artificial joints? No   Are you allergic to latex? No  "    Health Care Directive  Patient does not have a Health Care Directive: Discussed advance care planning with patient; however, patient declined at this time.    Preoperative Review of    reviewed - controlled substances reflected in medication list.          Patient Active Problem List    Diagnosis Date Noted    Ganglion cyst 07/10/2024     Priority: Medium    Controlled substance agreement signed 07/09/2024     Priority: Medium    Elevated coronary artery calcium score 12/18/2023     Priority: Medium    Family history of coronary arteriosclerosis 06/15/2023     Priority: Medium    Overweight (BMI 25.0-29.9) 06/15/2023     Priority: Medium    Uncomplicated alcohol dependence (H) 06/15/2023     Priority: Medium    Insomnia, unspecified type 03/04/2022     Priority: Medium    Family history of prostate cancer 03/04/2022     Priority: Medium      No past medical history on file.  Past Surgical History:   Procedure Laterality Date    REMOVAL OF SPERM DUCT(S)      Description: Surgery Of Male Genitalia Vasectomy;  Recorded: 11/13/2013;    [facility] APPENDECTOMY      Description: Appendectomy;  Recorded: 08/30/2012;     Current Outpatient Medications   Medication Sig Dispense Refill    atorvastatin (LIPITOR) 40 MG tablet Take 1 tablet (40 mg) by mouth daily. 90 tablet 2    zolpidem (AMBIEN) 5 MG tablet 1 at bedtime as needed. 30 tablet 0    diazepam (VALIUM) 5 MG tablet Take 1 tab 2 hrs before MRI, take 1 tab 1 hr before MRI only if needed 2 tablet 0       No Known Allergies     Social History     Tobacco Use    Smoking status: Former     Current packs/day: 0.50     Types: Cigarettes     Passive exposure: Never    Smokeless tobacco: Never   Substance Use Topics    Alcohol use: Not on file       History   Drug Use Not on file               Objective    /76 (BP Location: Right arm, Patient Position: Sitting, Cuff Size: Adult Regular)   Pulse 60   Temp 97.9  F (36.6  C)   Resp 16   Ht 1.702 m (5' 7\")   Wt 83.9 " "kg (185 lb)   SpO2 99%   BMI 28.98 kg/m     Estimated body mass index is 28.98 kg/m  as calculated from the following:    Height as of this encounter: 1.702 m (5' 7\").    Weight as of this encounter: 83.9 kg (185 lb).  Physical Exam  GENERAL: alert and no distress  NECK: no adenopathy, no asymmetry, masses, or scars  RESP: lungs clear to auscultation - no rales, rhonchi or wheezes  CV: regular rate and rhythm, normal S1 S2, no S3 or S4, no murmur, click or rub, no peripheral edema  ABDOMEN: soft, nontender, no hepatosplenomegaly, no masses and bowel sounds normal  MS: no gross musculoskeletal defects noted, no edema    Recent Labs   Lab Test 07/09/24  1246      POTASSIUM 4.2   CR 0.90        Diagnostics  Labs pending at this time.  Results will be reviewed when available.   No EKG required, no history of coronary heart disease, significant arrhythmia, peripheral arterial disease or other structural heart disease.    Revised Cardiac Risk Index (RCRI)  The patient has the following serious cardiovascular risks for perioperative complications:   - No serious cardiac risks = 0 points     RCRI Interpretation: 1 point: Class II (low risk - 0.9% complication rate)         Signed Electronically by: Elliot Edmond CNP  A copy of this evaluation report is provided to the requesting physician.        "

## 2025-01-21 NOTE — H&P (VIEW-ONLY)
Preoperative Evaluation  [facility]  [address] [address]  Phone: [phone]  Fax: [phone]  Primary Provider: Elliot Edmond CNP  Pre-op Performing Provider: Elliot Edmond CNP  Jan 21, 2025 1/21/2025   Surgical Information   What procedure is being done? [id number] vertebrae   Facility or Hospital where procedure/surgery will be performed: [facility]   Who is doing the procedure / surgery? tabatha rea   Date of surgery / procedure: jan 29   Time of surgery / procedure: 11:00   Where do you plan to recover after surgery? at home with family     Fax number for surgical facility: Note does not need to be faxed, will be available electronically in Epic.    Assessment & Plan     The proposed surgical procedure is considered INTERMEDIATE risk.    Preoperative examination  No contraindications for planned procedure  - Potassium; Future  - Hemoglobin; Future    Cervical radiculopathy  He has trialed and failed conservative measures.  Plan is to move forward with a hemilaminectomy, foraminotomy, and possible microdiscectomy    Elevated coronary artery calcium score  No anginal symptoms. Continues on atorvastatin.  He is not on a baby aspirin    Insomnia, unspecified type  Controlled on zolpidem  - zolpidem (AMBIEN) 5 MG tablet; 1 at bedtime as needed.    Patient Instructions   Hold all supplements, aspirin and NSAIDs for 7 days prior to surgery.     Follow your surgeon's direction on when to stop eating and drinking prior to surgery.    Your surgeon will be managing your pain after your surgery.      Remove all jewelry and metal piercings before your surgery.     Remove nail polish from fingers before surgery.    If you use a CPAP machine, bring this with you to surgery.      The longitudinal plan of care for the diagnosis(es)/condition(s) as documented were addressed during this visit. Due to the added complexity in care, I will continue to  support Brayden in the subsequent management and with ongoing continuity of care.       - No identified additional risk factors other than previously addressed         Recommendation  Approval given to proceed with proposed procedure, without further diagnostic evaluation.    Subjective   Brayden is a 49 year old, presenting for the following:  Pre-Op Exam (Left cervical 5-cervical 6 hemilaminectomy and foraminotomy with possible microdiscectomy.)          1/21/2025     9:50 AM   Additional Questions   Roomed by Nancie ZAIDI related to upcoming procedure: As above        1/21/2025   Pre-Op Questionnaire   Have you ever had a heart attack or stroke? No   Have you ever had surgery on your heart or blood vessels, such as a stent placement, a coronary artery bypass, or surgery on an artery in your head, neck, heart, or legs? No   Do you have chest pain with activity? No   Do you have a history of heart failure? No   Do you currently have a cold, bronchitis or symptoms of other infection? No   Do you have a cough, shortness of breath, or wheezing? No   Do you or anyone in your family have previous history of blood clots? No   Do you or does anyone in your family have a serious bleeding problem such as prolonged bleeding following surgeries or cuts? No   Have you ever had problems with anemia or been told to take iron pills? No   Have you had any abnormal blood loss such as black, tarry or bloody stools? No   Have you ever had a blood transfusion? No   Are you willing to have a blood transfusion if it is medically needed before, during, or after your surgery? Yes   Have you or any of your relatives ever had problems with anesthesia? No   Do you have sleep apnea, excessive snoring or daytime drowsiness? No   Do you have any artifical heart valves or other implanted medical devices like a pacemaker, defibrillator, or continuous glucose monitor? No   Do you have artificial joints? No   Are you allergic to latex? No  "    Health Care Directive  Patient does not have a Health Care Directive: Discussed advance care planning with patient; however, patient declined at this time.    Preoperative Review of    reviewed - controlled substances reflected in medication list.          Patient Active Problem List    Diagnosis Date Noted    Ganglion cyst 07/10/2024     Priority: Medium    Controlled substance agreement signed 07/09/2024     Priority: Medium    Elevated coronary artery calcium score 12/18/2023     Priority: Medium    Family history of coronary arteriosclerosis 06/15/2023     Priority: Medium    Overweight (BMI 25.0-29.9) 06/15/2023     Priority: Medium    Uncomplicated alcohol dependence (H) 06/15/2023     Priority: Medium    Insomnia, unspecified type 03/04/2022     Priority: Medium    Family history of prostate cancer 03/04/2022     Priority: Medium      No past medical history on file.  Past Surgical History:   Procedure Laterality Date    REMOVAL OF SPERM DUCT(S)      Description: Surgery Of Male Genitalia Vasectomy;  Recorded: 11/13/2013;    [facility] APPENDECTOMY      Description: Appendectomy;  Recorded: 08/30/2012;     Current Outpatient Medications   Medication Sig Dispense Refill    atorvastatin (LIPITOR) 40 MG tablet Take 1 tablet (40 mg) by mouth daily. 90 tablet 2    zolpidem (AMBIEN) 5 MG tablet 1 at bedtime as needed. 30 tablet 0    diazepam (VALIUM) 5 MG tablet Take 1 tab 2 hrs before MRI, take 1 tab 1 hr before MRI only if needed 2 tablet 0       No Known Allergies     Social History     Tobacco Use    Smoking status: Former     Current packs/day: 0.50     Types: Cigarettes     Passive exposure: Never    Smokeless tobacco: Never   Substance Use Topics    Alcohol use: Not on file       History   Drug Use Not on file               Objective    /76 (BP Location: Right arm, Patient Position: Sitting, Cuff Size: Adult Regular)   Pulse 60   Temp 97.9  F (36.6  C)   Resp 16   Ht 1.702 m (5' 7\")   Wt 83.9 " "kg (185 lb)   SpO2 99%   BMI 28.98 kg/m     Estimated body mass index is 28.98 kg/m  as calculated from the following:    Height as of this encounter: 1.702 m (5' 7\").    Weight as of this encounter: 83.9 kg (185 lb).  Physical Exam  GENERAL: alert and no distress  NECK: no adenopathy, no asymmetry, masses, or scars  RESP: lungs clear to auscultation - no rales, rhonchi or wheezes  CV: regular rate and rhythm, normal S1 S2, no S3 or S4, no murmur, click or rub, no peripheral edema  ABDOMEN: soft, nontender, no hepatosplenomegaly, no masses and bowel sounds normal  MS: no gross musculoskeletal defects noted, no edema    Recent Labs   Lab Test 07/09/24  1246      POTASSIUM 4.2   CR 0.90        Diagnostics  Labs pending at this time.  Results will be reviewed when available.   No EKG required, no history of coronary heart disease, significant arrhythmia, peripheral arterial disease or other structural heart disease.    Revised Cardiac Risk Index (RCRI)  The patient has the following serious cardiovascular risks for perioperative complications:   - No serious cardiac risks = 0 points     RCRI Interpretation: 1 point: Class II (low risk - 0.9% complication rate)         Signed Electronically by: Elliot Edmond CNP  A copy of this evaluation report is provided to the requesting physician.        "

## 2025-01-22 ENCOUNTER — VIRTUAL VISIT (OUTPATIENT)
Dept: NEUROSURGERY | Facility: CLINIC | Age: 50
End: 2025-01-22
Payer: COMMERCIAL

## 2025-01-22 DIAGNOSIS — M54.12 CERVICAL RADICULOPATHY: Primary | ICD-10-CM

## 2025-01-22 PROCEDURE — 99207 PR NO CHARGE NURSE ONLY: CPT | Mod: 93

## 2025-01-22 NOTE — PROGRESS NOTES
Called patient, discussed surgery, post-op course, expectations, follow up plan.    Reviewed H&P from 1/21/25 - approved  Labs - 1/21/25 within acceptable limits  EKG - no EKG per H&P    MRI done on 11/15/24 - in [place]    To OR as planned.     Procedure - Left cervical 5-cervical 6 hemilaminectomy and foraminotomy with possible microdiscectomy    Hospital - [facility]    Surgeon - Dr. Wilkes    Date of surgery - 1/29/25    Start time - 11:55 AM    Check in time - 9:55 AM    Reviewed NPO instructions:  Do not eat for 8 hours before surgery.  You can drink clear liquids up until 2 hours before surgery.     Reviewed with patient: Arrive 2 hours prior to scheduled surgery.    NSAID: none  Anticoagulants, blood thinners: none  GLP-1 agonists: none  DMARD: none  Supplements, herbal remedies, vitamins: none  Medications from pain clinic: none    Patient confirmed they have help/assistance in place at home upon discharge    Instructions: using a washcloth and a bottle of provided Hibiclens, wash your body, avoiding your face and genitals. Preferably, shower the night before surgery and the morning of surgery using a half a bottle each time for your whole body shower.    Patient was informed that we will provide up to 6 weeks prescription of pain medication for post-operative pain.    Instructed patient about the following: After your surgery, if you will be staying in-patient, a nursing provider team will be monitoring you closely throughout your stay and communicate your health status to your surgeon and other providers.  You will be seen by Advanced Practice Providers (e.g., nurse practitioners, clinic nurse specialist, and physician assistants) who will check on you regularly to assess the status of your surgery.

## 2025-01-28 ENCOUNTER — ANESTHESIA EVENT (OUTPATIENT)
Dept: SURGERY | Facility: HOSPITAL | Age: 50
End: 2025-01-28
Payer: COMMERCIAL

## 2025-01-29 ENCOUNTER — HOSPITAL ENCOUNTER (OUTPATIENT)
Facility: HOSPITAL | Age: 50
Discharge: HOME | End: 2025-01-29
Attending: SURGERY | Admitting: SURGERY
Payer: COMMERCIAL

## 2025-01-29 ENCOUNTER — APPOINTMENT (OUTPATIENT)
Dept: RADIOLOGY | Facility: HOSPITAL | Age: 50
End: 2025-01-29
Attending: PHYSICIAN ASSISTANT
Payer: COMMERCIAL

## 2025-01-29 ENCOUNTER — ANESTHESIA (OUTPATIENT)
Dept: SURGERY | Facility: HOSPITAL | Age: 50
End: 2025-01-29
Payer: COMMERCIAL

## 2025-01-29 VITALS
WEIGHT: 179.38 LBS | RESPIRATION RATE: 18 BRPM | HEART RATE: 59 BPM | TEMPERATURE: 98.6 F | HEIGHT: 67 IN | BODY MASS INDEX: 28.16 KG/M2 | DIASTOLIC BLOOD PRESSURE: 72 MMHG | OXYGEN SATURATION: 99 % | SYSTOLIC BLOOD PRESSURE: 143 MMHG

## 2025-01-29 DIAGNOSIS — M54.12 CERVICAL RADICULOPATHY: Primary | ICD-10-CM

## 2025-01-29 PROCEDURE — 250N000011 HC RX IP 250 OP 636: Performed by: PHYSICIAN ASSISTANT

## 2025-01-29 PROCEDURE — 250N000011 HC RX IP 250 OP 636: Performed by: ANESTHESIOLOGY

## 2025-01-29 PROCEDURE — 250N000011 HC RX IP 250 OP 636: Performed by: SURGERY

## 2025-01-29 PROCEDURE — 999N000063 XR CROSSTABLE LATERAL CERVICAL SPINE PORTABLE

## 2025-01-29 PROCEDURE — 250N000013 HC RX MED GY IP 250 OP 250 PS 637: Performed by: PHYSICIAN ASSISTANT

## 2025-01-29 PROCEDURE — 272N000001 HC OR GENERAL SUPPLY STERILE: Performed by: SURGERY

## 2025-01-29 PROCEDURE — 999N000141 HC STATISTIC PRE-PROCEDURE NURSING ASSESSMENT: Performed by: SURGERY

## 2025-01-29 PROCEDURE — 250N000009 HC RX 250: Performed by: NURSE ANESTHETIST, CERTIFIED REGISTERED

## 2025-01-29 PROCEDURE — 250N000025 HC SEVOFLURANE, PER MIN: Performed by: SURGERY

## 2025-01-29 PROCEDURE — 710N000009 HC RECOVERY PHASE 1, LEVEL 1, PER MIN: Performed by: SURGERY

## 2025-01-29 PROCEDURE — 250N000011 HC RX IP 250 OP 636: Performed by: NURSE ANESTHETIST, CERTIFIED REGISTERED

## 2025-01-29 PROCEDURE — 63020 LAMOT DCMPRN NRV RT 1 CERV: CPT | Mod: AS | Performed by: PHYSICIAN ASSISTANT

## 2025-01-29 PROCEDURE — 710N000012 HC RECOVERY PHASE 2, PER MINUTE: Performed by: SURGERY

## 2025-01-29 PROCEDURE — 360N000077 HC SURGERY LEVEL 4, PER MIN: Performed by: SURGERY

## 2025-01-29 PROCEDURE — 250N000009 HC RX 250: Performed by: PHYSICIAN ASSISTANT

## 2025-01-29 PROCEDURE — 63020 LAMOT DCMPRN NRV RT 1 CERV: CPT | Mod: LT | Performed by: SURGERY

## 2025-01-29 PROCEDURE — 258N000003 HC RX IP 258 OP 636: Performed by: NURSE ANESTHETIST, CERTIFIED REGISTERED

## 2025-01-29 PROCEDURE — 370N000017 HC ANESTHESIA TECHNICAL FEE, PER MIN: Performed by: SURGERY

## 2025-01-29 RX ORDER — OXYCODONE HYDROCHLORIDE 5 MG/1
5 TABLET ORAL
Status: DISCONTINUED | OUTPATIENT
Start: 2025-01-29 | End: 2025-01-29 | Stop reason: HOSPADM

## 2025-01-29 RX ORDER — ONDANSETRON 2 MG/ML
4 INJECTION INTRAMUSCULAR; INTRAVENOUS EVERY 30 MIN PRN
Status: DISCONTINUED | OUTPATIENT
Start: 2025-01-29 | End: 2025-01-29 | Stop reason: HOSPADM

## 2025-01-29 RX ORDER — METHOCARBAMOL 500 MG/1
500 TABLET, FILM COATED ORAL EVERY 6 HOURS PRN
Qty: 42 TABLET | Refills: 0 | Status: SHIPPED | OUTPATIENT
Start: 2025-01-29

## 2025-01-29 RX ORDER — ONDANSETRON 4 MG/1
4 TABLET, ORALLY DISINTEGRATING ORAL EVERY 6 HOURS PRN
Status: DISCONTINUED | OUTPATIENT
Start: 2025-01-29 | End: 2025-01-29 | Stop reason: HOSPADM

## 2025-01-29 RX ORDER — FENTANYL CITRATE 50 UG/ML
50 INJECTION, SOLUTION INTRAMUSCULAR; INTRAVENOUS EVERY 5 MIN PRN
Status: DISCONTINUED | OUTPATIENT
Start: 2025-01-29 | End: 2025-01-29 | Stop reason: HOSPADM

## 2025-01-29 RX ORDER — NALOXONE HYDROCHLORIDE 0.4 MG/ML
0.1 INJECTION, SOLUTION INTRAMUSCULAR; INTRAVENOUS; SUBCUTANEOUS
Status: DISCONTINUED | OUTPATIENT
Start: 2025-01-29 | End: 2025-01-29 | Stop reason: HOSPADM

## 2025-01-29 RX ORDER — OXYCODONE HYDROCHLORIDE 5 MG/1
5 TABLET ORAL EVERY 4 HOURS PRN
Qty: 42 TABLET | Refills: 0 | Status: SHIPPED | OUTPATIENT
Start: 2025-01-29

## 2025-01-29 RX ORDER — SODIUM CHLORIDE, SODIUM LACTATE, POTASSIUM CHLORIDE, CALCIUM CHLORIDE 600; 310; 30; 20 MG/100ML; MG/100ML; MG/100ML; MG/100ML
INJECTION, SOLUTION INTRAVENOUS CONTINUOUS
Status: DISCONTINUED | OUTPATIENT
Start: 2025-01-29 | End: 2025-01-29 | Stop reason: HOSPADM

## 2025-01-29 RX ORDER — HYDROMORPHONE HCL IN WATER/PF 6 MG/30 ML
0.4 PATIENT CONTROLLED ANALGESIA SYRINGE INTRAVENOUS
Status: DISCONTINUED | OUTPATIENT
Start: 2025-01-29 | End: 2025-01-29 | Stop reason: HOSPADM

## 2025-01-29 RX ORDER — HYDROMORPHONE HCL IN WATER/PF 6 MG/30 ML
0.4 PATIENT CONTROLLED ANALGESIA SYRINGE INTRAVENOUS EVERY 5 MIN PRN
Status: DISCONTINUED | OUTPATIENT
Start: 2025-01-29 | End: 2025-01-29 | Stop reason: HOSPADM

## 2025-01-29 RX ORDER — FENTANYL CITRATE 50 UG/ML
25 INJECTION, SOLUTION INTRAMUSCULAR; INTRAVENOUS EVERY 5 MIN PRN
Status: DISCONTINUED | OUTPATIENT
Start: 2025-01-29 | End: 2025-01-29 | Stop reason: HOSPADM

## 2025-01-29 RX ORDER — OXYCODONE HYDROCHLORIDE 5 MG/1
10 TABLET ORAL EVERY 4 HOURS PRN
Status: DISCONTINUED | OUTPATIENT
Start: 2025-01-29 | End: 2025-01-29 | Stop reason: HOSPADM

## 2025-01-29 RX ORDER — METHYLPREDNISOLONE 4 MG/1
TABLET ORAL
Qty: 21 TABLET | Refills: 0 | Status: SHIPPED | OUTPATIENT
Start: 2025-01-29

## 2025-01-29 RX ORDER — PROCHLORPERAZINE MALEATE 10 MG
10 TABLET ORAL EVERY 6 HOURS PRN
Status: DISCONTINUED | OUTPATIENT
Start: 2025-01-29 | End: 2025-01-29 | Stop reason: HOSPADM

## 2025-01-29 RX ORDER — LIDOCAINE HYDROCHLORIDE 10 MG/ML
INJECTION, SOLUTION INFILTRATION; PERINEURAL PRN
Status: DISCONTINUED | OUTPATIENT
Start: 2025-01-29 | End: 2025-01-29

## 2025-01-29 RX ORDER — HYDROMORPHONE HCL IN WATER/PF 6 MG/30 ML
0.2 PATIENT CONTROLLED ANALGESIA SYRINGE INTRAVENOUS
Status: DISCONTINUED | OUTPATIENT
Start: 2025-01-29 | End: 2025-01-29 | Stop reason: HOSPADM

## 2025-01-29 RX ORDER — OXYCODONE HYDROCHLORIDE 5 MG/1
5 TABLET ORAL EVERY 4 HOURS PRN
Status: DISCONTINUED | OUTPATIENT
Start: 2025-01-29 | End: 2025-01-29 | Stop reason: HOSPADM

## 2025-01-29 RX ORDER — FENTANYL CITRATE 50 UG/ML
INJECTION, SOLUTION INTRAMUSCULAR; INTRAVENOUS PRN
Status: DISCONTINUED | OUTPATIENT
Start: 2025-01-29 | End: 2025-01-29

## 2025-01-29 RX ORDER — LIDOCAINE 40 MG/G
CREAM TOPICAL
Status: DISCONTINUED | OUTPATIENT
Start: 2025-01-29 | End: 2025-01-29 | Stop reason: HOSPADM

## 2025-01-29 RX ORDER — BUPIVACAINE HYDROCHLORIDE 2.5 MG/ML
INJECTION, SOLUTION INFILTRATION; PERINEURAL PRN
Status: DISCONTINUED | OUTPATIENT
Start: 2025-01-29 | End: 2025-01-29 | Stop reason: HOSPADM

## 2025-01-29 RX ORDER — OXYCODONE HYDROCHLORIDE 5 MG/1
10 TABLET ORAL
Status: DISCONTINUED | OUTPATIENT
Start: 2025-01-29 | End: 2025-01-29 | Stop reason: HOSPADM

## 2025-01-29 RX ORDER — SODIUM CHLORIDE, SODIUM LACTATE, POTASSIUM CHLORIDE, CALCIUM CHLORIDE 600; 310; 30; 20 MG/100ML; MG/100ML; MG/100ML; MG/100ML
INJECTION, SOLUTION INTRAVENOUS CONTINUOUS PRN
Status: DISCONTINUED | OUTPATIENT
Start: 2025-01-29 | End: 2025-01-29

## 2025-01-29 RX ORDER — DEXAMETHASONE SODIUM PHOSPHATE 10 MG/ML
INJECTION, SOLUTION INTRAMUSCULAR; INTRAVENOUS PRN
Status: DISCONTINUED | OUTPATIENT
Start: 2025-01-29 | End: 2025-01-29

## 2025-01-29 RX ORDER — ONDANSETRON 2 MG/ML
4 INJECTION INTRAMUSCULAR; INTRAVENOUS EVERY 6 HOURS PRN
Status: DISCONTINUED | OUTPATIENT
Start: 2025-01-29 | End: 2025-01-29 | Stop reason: HOSPADM

## 2025-01-29 RX ORDER — DEXAMETHASONE SODIUM PHOSPHATE 4 MG/ML
4 INJECTION, SOLUTION INTRA-ARTICULAR; INTRALESIONAL; INTRAMUSCULAR; INTRAVENOUS; SOFT TISSUE
Status: DISCONTINUED | OUTPATIENT
Start: 2025-01-29 | End: 2025-01-29 | Stop reason: HOSPADM

## 2025-01-29 RX ORDER — PROPOFOL 10 MG/ML
INJECTION, EMULSION INTRAVENOUS PRN
Status: DISCONTINUED | OUTPATIENT
Start: 2025-01-29 | End: 2025-01-29

## 2025-01-29 RX ORDER — ONDANSETRON 4 MG/1
4 TABLET, ORALLY DISINTEGRATING ORAL EVERY 30 MIN PRN
Status: DISCONTINUED | OUTPATIENT
Start: 2025-01-29 | End: 2025-01-29 | Stop reason: HOSPADM

## 2025-01-29 RX ORDER — CEFAZOLIN SODIUM/WATER 2 G/20 ML
2 SYRINGE (ML) INTRAVENOUS
Status: COMPLETED | OUTPATIENT
Start: 2025-01-29 | End: 2025-01-29

## 2025-01-29 RX ORDER — CEFAZOLIN SODIUM/WATER 2 G/20 ML
2 SYRINGE (ML) INTRAVENOUS SEE ADMIN INSTRUCTIONS
Status: DISCONTINUED | OUTPATIENT
Start: 2025-01-29 | End: 2025-01-29 | Stop reason: HOSPADM

## 2025-01-29 RX ORDER — METHOCARBAMOL 500 MG/1
500 TABLET, FILM COATED ORAL EVERY 6 HOURS PRN
Status: DISCONTINUED | OUTPATIENT
Start: 2025-01-29 | End: 2025-01-29 | Stop reason: HOSPADM

## 2025-01-29 RX ORDER — GABAPENTIN 300 MG/1
300 CAPSULE ORAL
Status: COMPLETED | OUTPATIENT
Start: 2025-01-29 | End: 2025-01-29

## 2025-01-29 RX ORDER — HYDROMORPHONE HCL IN WATER/PF 6 MG/30 ML
0.2 PATIENT CONTROLLED ANALGESIA SYRINGE INTRAVENOUS EVERY 5 MIN PRN
Status: DISCONTINUED | OUTPATIENT
Start: 2025-01-29 | End: 2025-01-29 | Stop reason: HOSPADM

## 2025-01-29 RX ORDER — DEXAMETHASONE SODIUM PHOSPHATE 10 MG/ML
4 INJECTION, SOLUTION INTRAMUSCULAR; INTRAVENOUS
Status: DISCONTINUED | OUTPATIENT
Start: 2025-01-29 | End: 2025-01-29 | Stop reason: HOSPADM

## 2025-01-29 RX ADMIN — LIDOCAINE HYDROCHLORIDE 5 ML: 10 INJECTION, SOLUTION INFILTRATION; PERINEURAL at 10:45

## 2025-01-29 RX ADMIN — ROCURONIUM BROMIDE 20 MG: 50 INJECTION, SOLUTION INTRAVENOUS at 11:20

## 2025-01-29 RX ADMIN — FENTANYL CITRATE 50 MCG: 50 INJECTION, SOLUTION INTRAMUSCULAR; INTRAVENOUS at 12:08

## 2025-01-29 RX ADMIN — FENTANYL CITRATE 25 MCG: 50 INJECTION, SOLUTION INTRAMUSCULAR; INTRAVENOUS at 13:17

## 2025-01-29 RX ADMIN — FENTANYL CITRATE 100 MCG: 50 INJECTION, SOLUTION INTRAMUSCULAR; INTRAVENOUS at 10:41

## 2025-01-29 RX ADMIN — PROPOFOL 40 MG: 10 INJECTION, EMULSION INTRAVENOUS at 10:52

## 2025-01-29 RX ADMIN — SODIUM CHLORIDE, POTASSIUM CHLORIDE, SODIUM LACTATE AND CALCIUM CHLORIDE: 600; 310; 30; 20 INJECTION, SOLUTION INTRAVENOUS at 12:26

## 2025-01-29 RX ADMIN — ROCURONIUM BROMIDE 10 MG: 50 INJECTION, SOLUTION INTRAVENOUS at 12:30

## 2025-01-29 RX ADMIN — PROPOFOL 170 MG: 10 INJECTION, EMULSION INTRAVENOUS at 10:45

## 2025-01-29 RX ADMIN — MIDAZOLAM HYDROCHLORIDE 2 MG: 1 INJECTION, SOLUTION INTRAMUSCULAR; INTRAVENOUS at 10:39

## 2025-01-29 RX ADMIN — DEXAMETHASONE SODIUM PHOSPHATE 10 MG: 10 INJECTION, SOLUTION INTRAMUSCULAR; INTRAVENOUS at 10:50

## 2025-01-29 RX ADMIN — SODIUM CHLORIDE, POTASSIUM CHLORIDE, SODIUM LACTATE AND CALCIUM CHLORIDE: 600; 310; 30; 20 INJECTION, SOLUTION INTRAVENOUS at 10:39

## 2025-01-29 RX ADMIN — GABAPENTIN 300 MG: 300 CAPSULE ORAL at 10:35

## 2025-01-29 RX ADMIN — FENTANYL CITRATE 50 MCG: 50 INJECTION, SOLUTION INTRAMUSCULAR; INTRAVENOUS at 11:17

## 2025-01-29 RX ADMIN — OXYCODONE HYDROCHLORIDE 5 MG: 5 TABLET ORAL at 13:39

## 2025-01-29 RX ADMIN — Medication 2 G: at 10:41

## 2025-01-29 RX ADMIN — ROCURONIUM BROMIDE 10 MG: 50 INJECTION, SOLUTION INTRAVENOUS at 12:10

## 2025-01-29 RX ADMIN — ROCURONIUM BROMIDE 60 MG: 50 INJECTION, SOLUTION INTRAVENOUS at 10:45

## 2025-01-29 RX ADMIN — ROCURONIUM BROMIDE 20 MG: 50 INJECTION, SOLUTION INTRAVENOUS at 11:45

## 2025-01-29 RX ADMIN — FENTANYL CITRATE 25 MCG: 50 INJECTION, SOLUTION INTRAMUSCULAR; INTRAVENOUS at 13:27

## 2025-01-29 ASSESSMENT — ACTIVITIES OF DAILY LIVING (ADL)
ADLS_ACUITY_SCORE: 16
ADLS_ACUITY_SCORE: 15
ADLS_ACUITY_SCORE: 16

## 2025-01-29 NOTE — ANESTHESIA CARE TRANSFER NOTE
Patient: Brayden Peres    Procedure: Procedure(s):  Left cervical 5-cervical 6 hemilaminectomy and foraminotomy with microdiscectomy.       Diagnosis: Cervical radiculopathy [M54.12]  Diagnosis Additional Information: No value filed.    Anesthesia Type:   General     Note:    Oropharynx: oropharynx clear of all foreign objects and spontaneously breathing  Level of Consciousness: awake  Oxygen Supplementation: face mask  Level of Supplemental Oxygen (L/min / FiO2): 8  Independent Airway: airway patency satisfactory and stable  Dentition: dentition unchanged  Vital Signs Stable: post-procedure vital signs reviewed and stable  Report to RN Given: handoff report given  Patient transferred to: PACU    Handoff Report: Identifed the Patient, Identified the Reponsible Provider, Reviewed the pertinent medical history, Discussed the surgical course, Reviewed Intra-OP anesthesia mangement and issues during anesthesia, Set expectations for post-procedure period and Allowed opportunity for questions and acknowledgement of understanding      Vitals:  Vitals Value Taken Time   /76    Temp 36.5  C (97.7  F) 01/29/25 1310   Pulse 68 01/29/25 1310   Resp     SpO2 99 % 01/29/25 1310   Vitals shown include unfiled device data.    Electronically Signed By: WINSTON Jasmine CRNA  January 29, 2025  1:11 PM

## 2025-01-29 NOTE — INTERVAL H&P NOTE
"I have reviewed the surgical (or preoperative) H&P that is linked to this encounter, and examined the patient. There are no significant changes    Clinical Conditions Present on Arrival:  Clinically Significant Risk Factors Present on Admission                       # Overweight: Estimated body mass index is 28.09 kg/m  as calculated from the following:    Height as of this encounter: 1.702 m (5' 7\").    Weight as of this encounter: 81.4 kg (179 lb 6 oz).       "

## 2025-01-29 NOTE — ANESTHESIA PREPROCEDURE EVALUATION
Anesthesia Pre-Procedure Evaluation    Patient: Brayden Peres   MRN: [id number] : 1975        Procedure : Procedure(s):  Left cervical 5-cervical 6 hemilaminectomy and foraminotomy with possible microdiscectomy.          Past Medical History:   Diagnosis Date    Cervical radiculopathy     Insomnia     Overweight     Uncomplicated alcohol dependence (H)       Past Surgical History:   Procedure Laterality Date    REMOVAL OF SPERM DUCT(S)      Description: Surgery Of Male Genitalia Vasectomy;  Recorded: 2013;    [facility] APPENDECTOMY      Description: Appendectomy;  Recorded: 2012;      No Known Allergies   Social History     Tobacco Use    Smoking status: Former     Current packs/day: 0.50     Types: Cigarettes     Passive exposure: Never    Smokeless tobacco: Never   Substance Use Topics    Alcohol use: Not on file      Wt Readings from Last 1 Encounters:   25 83.9 kg (185 lb)        Anesthesia Evaluation            ROS/MED HX  ENT/Pulmonary:  - neg pulmonary ROS     Neurologic: Comment: Cervical radiculopathy. Symptoms down left arm when neck extended up and to the left      Cardiovascular:     (+) Dyslipidemia - -   -  - -                                      METS/Exercise Tolerance:     Hematologic:       Musculoskeletal:       GI/Hepatic:  - neg GI/hepatic ROS     Renal/Genitourinary:  - neg Renal ROS     Endo:  - neg endo ROS     Psychiatric/Substance Use:  - neg psychiatric ROS   (+)   alcohol abuse      Infectious Disease:       Malignancy:       Other:            Physical Exam    Airway        Mallampati: II    Neck ROM: limited     Respiratory Devices and Support         Dental       (+) Completely normal teeth      Cardiovascular   cardiovascular exam normal          Pulmonary   pulmonary exam normal                OUTSIDE LABS:  CBC:   Lab Results   Component Value Date    HGB 14.6 2025     BMP:   Lab Results   Component Value Date     2024    POTASSIUM 4.5  "01/21/2025    POTASSIUM 4.2 07/09/2024    CHLORIDE 103 07/09/2024    CO2 26 07/09/2024    BUN 13.6 07/09/2024    CR 0.90 07/09/2024    GLC 95 07/09/2024    GLC 80 03/04/2022     COAGS: No results found for: \"PTT\", \"INR\", \"FIBR\"  POC: No results found for: \"BGM\", \"HCG\", \"HCGS\"  HEPATIC:   Lab Results   Component Value Date    ALBUMIN 4.7 07/09/2024    PROTTOTAL 7.3 07/09/2024    ALT 40 07/09/2024    AST 33 07/09/2024    ALKPHOS 57 07/09/2024    BILITOTAL 0.8 07/09/2024     OTHER:   Lab Results   Component Value Date    A1C 5.0 06/15/2023    CHELA 9.3 07/09/2024       Anesthesia Plan    ASA Status:  2    NPO Status:  NPO Appropriate    Anesthesia Type: General.     - Airway: ETT   Induction: Intravenous.   Maintenance: Balanced.   Techniques and Equipment:     - Airway: Video-Laryngoscope       Consents    Anesthesia Plan(s) and associated risks, benefits, and realistic alternatives discussed. Questions answered and patient/representative(s) expressed understanding.     - Discussed: Risks, Benefits and Alternatives for BOTH SEDATION and the PROCEDURE were discussed     - Discussed with:  Patient            Postoperative Care    Pain management: Multi-modal analgesia.   PONV prophylaxis: Ondansetron (or other 5HT-3), Dexamethasone or Solumedrol     Comments:               Dale Mcgill MD    I have reviewed the pertinent notes and labs in the chart from the past 30 days and (re)examined the patient.  Any updates or changes from those notes are reflected in this note.    Clinically Significant Risk Factors Present on Admission                             # Overweight: Estimated body mass index is 28.98 kg/m  as calculated from the following:    Height as of 1/21/25: 1.702 m (5' 7\").    Weight as of 1/21/25: 83.9 kg (185 lb).                "

## 2025-01-29 NOTE — BRIEF OP NOTE
[facility]    Brief Operative Note    Pre-operative diagnosis: Cervical radiculopathy [M54.12], cervical disc herniation  Post-operative diagnosis Same as pre-operative diagnosis    Procedure: Left cervical 5-cervical 6 hemilaminectomy and foraminotomy with microdiscectomy., Left - Spine    Surgeon: Surgeons and Role:     * Brittani Wilkes MD - Primary     * Desiree Briceño PA-C - Assisting  Anesthesia: General   Estimated Blood Loss: 25 cc    Drains: None  Specimens: * No specimens in log *  Findings:   None.  Complications: None.  Implants: * No implants in log *

## 2025-01-29 NOTE — ANESTHESIA POSTPROCEDURE EVALUATION
Patient: Brayden Peres    Procedure: Procedure(s):  Left cervical 5-cervical 6 hemilaminectomy and foraminotomy with microdiscectomy.       Anesthesia Type:  General    Note:  Disposition: Outpatient   Postop Pain Control: Uneventful            Sign Out: Well controlled pain   PONV: No   Neuro/Psych: Uneventful            Sign Out: Acceptable/Baseline neuro status   Airway/Respiratory: Uneventful            Sign Out: Acceptable/Baseline resp. status   CV/Hemodynamics: Uneventful            Sign Out: Acceptable CV status; No obvious hypovolemia; No obvious fluid overload   Other NRE: NONE   DID A NON-ROUTINE EVENT OCCUR? No           Last vitals:  Vitals Value Taken Time   /79 01/29/25 1345   Temp 36.9  C (98.42  F) 01/29/25 1354   Pulse 59 01/29/25 1354   Resp 22 01/29/25 1353   SpO2 99 % 01/29/25 1354   Vitals shown include unfiled device data.    Electronically Signed By: Ramiro Mayer MD  January 29, 2025  5:05 PM

## 2025-01-29 NOTE — ANESTHESIA PROCEDURE NOTES
Airway         Procedure Start/Stop Times: 1/29/2025 10:47 AM  Staff -        CRNA: Sánchez Bradford APRN CRNA       Performed By: CRNA  Consent for Airway        Urgency: elective  Indications and Patient Condition       Indications for airway management: jimmy-procedural         Mask difficulty assessment: 2 - vent by mask + OA or adjuvant +/- NMBA    Final Airway Details       Final airway type: endotracheal airway       Successful airway: ETT - single  Endotracheal Airway Details        ETT size (mm): 8.0       Cuffed: yes       Cuff volume (mL): 7       Successful intubation technique: video laryngoscopy       VL Blade Size: Glidescope 4       Grade View of Cords: 1       Adjucts: stylet       Position: Right       Measured from: lips       Secured at (cm): 24       Bite block used: None    Post intubation assessment        Placement verified by: capnometry, equal breath sounds and chest rise        Number of attempts at approach: 1       Number of other approaches attempted: 0       Secured with: tape       Ease of procedure: easy       Dentition: Intact    Medication(s) Administered   Medication Administration Time: 1/29/2025 10:47 AM

## 2025-01-30 NOTE — OP NOTE
NEUROSURGERY OPERATIVE REPORT     DATE OF SERVICE: 1/29/2025    PREOPERATIVE DIAGNOSES:  Cervical radiculopathy  Cervical disc herniation     POSTOPERATIVE DIAGNOSES:  Same    PROCEDURES:  1.  Left cervical 5-cervical 6 hemilaminectomy and foraminotomy with microdiscectomy   2.  Use of operating room microscope.    SURGEON:  Brittani Wilkes MD    ASSISTANT: Desiree Briceño PA-C     INDICATIONS:  Brayden Peres is a 48 yo male who presents with left numbness, tingling and weakness. Cervical xray shows no subluxations of dynamic instability. Cervical MRI shows mild canal stenosis with severe foraminal narrowing on left at cervical 5-6. He has less significant stenosis elsewhere. Recommend left cervical 5-cervical 6 hemilaminectomy and foraminotomy with possible microdiscectomy. Risks and benefits of surgery discussed including but not limited to infection, hematoma, nerve damage including paralysis, post op radiculitis, durotomy, inadequate symptom relief, recurrent cervical stenosis or narrowing, risks associated with the use of general anesthesia.     PROCEDURE:  After obtaining informed consent, the patient was brought to the operating room with pneumatic stockings in place.  IV antibiotics was administered.  He was intubated under general endotracheal anesthesia. He was positioned in the [place] headholder and affixed to the operating room table prone with all pressure points well padded He was prepped and draped in the normal sterile fashion. A preincisional infiltration of local anesthetic was performed along the midline and the incision was opened sharply and extended down to the fascia.   A subperiosteal dissection was undertaken to expose the lamina at left cervical 5-6. We verified levels with a lateral x-ray.  Once levels were verified, we then proceeded to remove the inferior portion of the lamina above and superior portion of the lamina below the appropriate level with a Midas drill.   We  drilled down to the ligamentum elevated the ligamentum from the underlying thecal sac and removed it with a combination of Kerrisons and curettes we brought in the operating room microscope for this and all microdissection.  We next drilled a foraminotomy to open up the lateral recess and expose the underlying impinged nerve root. We exposed the annulus of the disc space identified the nerve and osteophytes.  We trimmed the osteophytes and removed disc protruberance using a combination of pituitaries and back biting curettes to decompress the nerve.  We used a small blunt hook under the thecal sac and in the foramen to verify that there was no further disc material to milk out into the dissection site. Once the nerve appeared decompressed we used a small ball tip probe to verify no further pressure either in the canal or in the foramina.  We then proceeded to copiously irrigate the incision with antibiotic-containing saline and achieved hemostasis.    An assistant was needed for retraction, hemostasis and closure.    The incision was closed in layers with interrupted 0 Vicryl to approximate the muscle and fascia, inverted 2-0 PDS to approximate the subcutaneous tissues and Monocryl to approximate the skin edges. Dermabond was then placed. Sponge and needle counts were correct prior to closure x2.  Sterile dressings were placed.      Patient tolerated the procedure well, was removed from the [place] head ziegler and  taken to the recovery room after he was extubated and further monitored.    ESTIMATED BLOOD LOSS: 25 ml    SPECIMENS: none    DRAIN: none    FINDINGS: none    Brittani Wilkes MD    Cc: Elliot Edmond

## 2025-02-13 ENCOUNTER — ALLIED HEALTH/NURSE VISIT (OUTPATIENT)
Dept: NEUROSURGERY | Facility: CLINIC | Age: 50
End: 2025-02-13
Payer: COMMERCIAL

## 2025-02-13 VITALS
HEART RATE: 73 BPM | SYSTOLIC BLOOD PRESSURE: 126 MMHG | OXYGEN SATURATION: 98 % | DIASTOLIC BLOOD PRESSURE: 69 MMHG | TEMPERATURE: 98.2 F

## 2025-02-13 DIAGNOSIS — Z98.890 S/P SPINAL SURGERY: Primary | ICD-10-CM

## 2025-02-13 NOTE — PROGRESS NOTES
POSTOPERATIVE FOLLOW-UP NURSE VISIT NOTE    Procedure: Left cervical 5-cervical 6 hemilaminectomy and foraminotomy with microdiscectomy.     Date: 1/29/25    Surgeon: Dr. Wilkes    Pre-op symptoms: left numbness, tingling and weakness     Post-op symptoms: numbness, tingling, weakness is improving slowly    Pain and medications: 1/10  Oxycodone 5 mg Q4 PRN:  not taking  Methocarbamol 500 mg Q6 PRN: not taking    Bracing compliance: N/A    Medications refilled: none    Imaging ordered: N/A    Wound:  CDI,  approximated, no drainage, minimal erythema, no temperature change compared to surrounding tissue, non tender. Small amount of dermabond still in place.

## 2025-02-13 NOTE — PATIENT INSTRUCTIONS
Follow-up 3/18/25 at 1:45 PM      A dressing is not required.    Keep the wound clean.    Wash your hands before touching the wound.  Ensure that anyone assisting you in the care of your wound washes her/his hands before touching the wound. Good handwashing can decrease the risk of serious infection.    If you are unable to see your wound, have someone check the wound daily for redness, swelling,or drainage. A small amount of drainage is normal.    You may shower.  Pat the wound dry. Do not rub.    No tub baths until the wound is well healed.  Usually 5-6 weeks.     If you develop redness, swelling, drainage, or temp 101 or greater, call our clinic.    * No lifting, pushing or pulling greater than 5-10 pound (this is about a gallon of milk) for the first 6 weeks after surgery .  *No repetitive bending, twisting, or jarring activities for 6 weeks.  *No overhead work  *No aerobic or strenuous activity  *No activities with increased risk of falls  *You may move about your home as tolerated  *You may walk up and down stairs as tolerated  *You may increase your activity slowly over the next 6 weeks    WALKING PROGRAM: As you can tolerate, walk daily-start with 5-10 minutes of continuous walking. This is in addition to the walking that you do as part of your daily activities. Increase the time that you walk by 5 minutes every couple of days. Do not exceed 30-45 minutes of continuous walking until seen in follow-up. Walking is the best exercise after surgery.  **Listen to your body, if you find that you are more painful or fatigued, you may need to proceed more slowly.    **Do not smoke or expose yourself to second hand smoke. Cigarette smoke can delay healing and cause complications.     DRIVING:  We recommend that you do not drive while taking medications for pain or muscle spasms. Always read and follow the advice on your prescription bottle. If you have questions, speak with your pharmacist.  We recommend that you do  not drive while wearing a brace, as it could limit your range of motion.    WORK: If you plan to return to work before your 6 week post-op appointment, call and discuss with one of the nurses in the neurosurgery office.

## 2025-02-19 ENCOUNTER — TELEPHONE (OUTPATIENT)
Dept: NEUROSURGERY | Facility: CLINIC | Age: 50
End: 2025-02-19
Payer: COMMERCIAL

## 2025-02-19 DIAGNOSIS — Z98.890 S/P SPINAL SURGERY: Primary | ICD-10-CM

## 2025-02-19 DIAGNOSIS — M54.12 CERVICAL RADICULOPATHY: ICD-10-CM

## 2025-03-18 ENCOUNTER — OFFICE VISIT (OUTPATIENT)
Dept: NEUROSURGERY | Facility: CLINIC | Age: 50
End: 2025-03-18
Payer: COMMERCIAL

## 2025-03-18 VITALS
WEIGHT: 177 LBS | BODY MASS INDEX: 27.78 KG/M2 | OXYGEN SATURATION: 96 % | HEART RATE: 59 BPM | SYSTOLIC BLOOD PRESSURE: 128 MMHG | DIASTOLIC BLOOD PRESSURE: 73 MMHG | HEIGHT: 67 IN

## 2025-03-18 DIAGNOSIS — Z98.890 S/P SPINAL SURGERY: Primary | ICD-10-CM

## 2025-03-18 PROCEDURE — 99024 POSTOP FOLLOW-UP VISIT: CPT

## 2025-03-18 ASSESSMENT — PAIN SCALES - GENERAL: PAINLEVEL_OUTOF10: MILD PAIN (2)

## 2025-03-18 NOTE — NURSING NOTE
"Brayden Peres is a 49 year old male who presents for:  Chief Complaint   Patient presents with    Surgical Followup     6 week post-op for cervical hemilaminectomy. Patient reports pain level 0 in neck but tingling causing pain about level 2 in left arm.        Initial Vitals:  /73   Pulse 59   Ht 5' 7\" (1.702 m)   Wt 177 lb (80.3 kg)   SpO2 96%   BMI 27.72 kg/m   Estimated body mass index is 27.72 kg/m  as calculated from the following:    Height as of this encounter: 5' 7\" (1.702 m).    Weight as of this encounter: 177 lb (80.3 kg). Body surface area is 1.95 meters squared. BP completed using cuff size: regular  Mild Pain (2)        Chris Chapman    "

## 2025-03-18 NOTE — PATIENT INSTRUCTIONS
- Okay to follow up as needed.     - Okay to start physical therapy.     - Okay to return to work. [place] paperwork.     - Okay to increase lifting to 25 pounds until 12 weeks (end of April) after that okay to gradually return to baseline     - Recommend alternating heat/ice, massage, light stretching, topical pain gels (biofreeze, icy hot, tiger balm).    -Please contact neurosurgery clinic via [employer] or telephone [phone] for questions, concerns, new or worsening symptoms.

## 2025-03-18 NOTE — PROGRESS NOTES
"[facility]  Follow Up Visit     HPI: 6 weeks s/p left cervical 5 to cervical 6 hemilaminectomy and foraminotomy with microdiscectomy with Dr. Wilkes 1/29/2025.    Overall doing well.  Initially after surgery patient developed diffuse left upper extremity numbness that has slowly been improving.  Currently numbness is localized to the left thumb, partial left tricep, partial left shoulder blade.  Denies right upper extremity symptoms, new or worsening radicular pain, paresthesias, weakness, incisional concerns.  Not currently taking any pain medications.  Scheduled to start physical therapy next week.  Has returned to work, primarily works at a desk.    Exam:  Constitutional:  Alert, well nourished, NAD.  HEENT: Normocephalic, atraumatic.   Pulm:  Without shortness of breath   CV:  No pitting edema of BLE.     Vital Signs: /73   Pulse 59   Ht 5' 7\" (1.702 m)   Wt 177 lb (80.3 kg)   SpO2 96%   BMI 27.72 kg/m      Neurological:  Awake  Alert  Oriented x 3  Motor exam:  Shoulder Abduction:  Right:  5/5    Left:  5/5  Biceps:                      Right:  5/5    Left:  5/5  Triceps:                     Right:  5/5    Left:  5/5  Wrist Extensors:       Right:  5/5    Left:  5/5  Wrist Flexors:           Right:  5/5    Left:  5/5  Intrinsics:                  Right:  5/5    Left:  5/5    Sensation intact throughout BUE  Incision: Well healed     Imaging:  No updated imaging available for review at time of clinic appointment.      Assessment/Plan:   6 weeks s/p left cervical 5 to cervical 6 hemilaminectomy and foraminotomy with microdiscectomy with Dr. Wilkes 1/29/2025.    Overall doing well.  Initially after surgery patient developed diffuse left upper extremity numbness that has slowly been improving.  Currently numbness is localized to the left thumb, partial left tricep, partial left shoulder blade.  Denies right upper extremity symptoms, new or worsening radicular pain, " paresthesias, weakness, incisional concerns.  Not currently taking any pain medications.  Scheduled to start physical therapy next week.  Has returned to work, primarily works at a desk.    Plan:  -Okay to start physical therapy  - Okay to gradually increase lifting to 25 pounds until 12 weeks postop, then okay to gradually return to baseline activity as tolerated  -Okay to follow-up as needed  -[facility] paperwork completed. Nursing staff to fax.       - Call our clinic  at [phone] for any incisional concerns, increased pain, new symptoms, or any other post operative questions or concerns    Discussed red flag symptoms and advised to seek medical attention if these develop. Patient voiced understanding and agreement.      Mirna Alfaro PA-C  [facility] Neurosurgery  [address] [place] [id number]

## 2025-03-18 NOTE — LETTER
"3/18/2025      Brayden Peres  [address]  [place] [address]      Dear Colleague,    Thank you for referring your patient, Brayden Peres, to the [facility]. Please see a copy of my visit note below.    [facility]  Follow Up Visit     HPI: 6 weeks s/p left cervical 5 to cervical 6 hemilaminectomy and foraminotomy with microdiscectomy with Dr. Wilkes 1/29/2025.    Overall doing well.  Initially after surgery patient developed diffuse left upper extremity numbness that has slowly been improving.  Currently numbness is localized to the left thumb, partial left tricep, partial left shoulder blade.  Denies right upper extremity symptoms, new or worsening radicular pain, paresthesias, weakness, incisional concerns.  Not currently taking any pain medications.  Scheduled to start physical therapy next week.  Has returned to work, primarily works at a desk.    Exam:  Constitutional:  Alert, well nourished, NAD.  HEENT: Normocephalic, atraumatic.   Pulm:  Without shortness of breath   CV:  No pitting edema of BLE.     Vital Signs: /73   Pulse 59   Ht 5' 7\" (1.702 m)   Wt 177 lb (80.3 kg)   SpO2 96%   BMI 27.72 kg/m      Neurological:  Awake  Alert  Oriented x 3  Motor exam:  Shoulder Abduction:  Right:  5/5    Left:  5/5  Biceps:                      Right:  5/5    Left:  5/5  Triceps:                     Right:  5/5    Left:  5/5  Wrist Extensors:       Right:  5/5    Left:  5/5  Wrist Flexors:           Right:  5/5    Left:  5/5  Intrinsics:                  Right:  5/5    Left:  5/5    Sensation intact throughout BUE  Incision: Well healed     Imaging:  No updated imaging available for review at time of clinic appointment.      Assessment/Plan:   6 weeks s/p left cervical 5 to cervical 6 hemilaminectomy and foraminotomy with microdiscectomy with Dr. Wilkes 1/29/2025.    Overall doing well.  Initially after surgery patient developed " diffuse left upper extremity numbness that has slowly been improving.  Currently numbness is localized to the left thumb, partial left tricep, partial left shoulder blade.  Denies right upper extremity symptoms, new or worsening radicular pain, paresthesias, weakness, incisional concerns.  Not currently taking any pain medications.  Scheduled to start physical therapy next week.  Has returned to work, primarily works at a desk.    Plan:  -Okay to start physical therapy  - Okay to gradually increase lifting to 25 pounds until 12 weeks postop, then okay to gradually return to baseline activity as tolerated  -Okay to follow-up as needed  -[facility] paperwork completed. Nursing staff to fax.       - Call our clinic  at [phone] for any incisional concerns, increased pain, new symptoms, or any other post operative questions or concerns    Discussed red flag symptoms and advised to seek medical attention if these develop. Patient voiced understanding and agreement.      Mirna Alfaro PA-C  [facility] Neurosurgery  [address] [place] [address]      Again, thank you for allowing me to participate in the care of your patient.        Sincerely,        Mirna Alfaro PA-C    Electronically signed

## 2025-03-20 ENCOUNTER — THERAPY VISIT (OUTPATIENT)
Dept: PHYSICAL THERAPY | Facility: REHABILITATION | Age: 50
End: 2025-03-20
Attending: SURGERY
Payer: COMMERCIAL

## 2025-03-20 DIAGNOSIS — M54.12 CERVICAL RADICULOPATHY: ICD-10-CM

## 2025-03-20 DIAGNOSIS — Z98.890 S/P SPINAL SURGERY: ICD-10-CM

## 2025-03-20 NOTE — PROGRESS NOTES
PHYSICAL THERAPY EVALUATION  Type of Visit: Evaluation       Fall Risk Screen:  Fall screen completed by: PT  Have you fallen 2 or more times in the past year?: No  Have you fallen and had an injury in the past year?: No  Is patient a fall risk?: No    Subjective         Presenting condition or subjective complaint: neck surgery recovery    Patient presents to therapy with a chief complaint of status post 6-week cervical surgery.  Per his last MD note he has some improved left upper extremity numbness that is improving primarily localized to left thumb partial left tricep partial left shoulder blade.  Has been back to work primarily working at a desk.  Underwent a left cervical 5 to cervical 6 hemilaminectomy and foraminotomies with microdiscectomy that took place on January 29, 2025 the patient previously participated in therapy prior to surgery with minimal improvement in symptoms and continued radicular symptoms as well as left shoulder pain. The patient was cleared to start physical therapy and was okay to gradually increase lifting to 25 pounds and then it when he reaches 12 weeks postop then he can gradually return to baseline activity as tolerated.  Faily constatnt N/T and sometimes pain in the back of the shoulder. Outer biceps and thumb. Wore a collar for the first 2-3 weeks and then the initial pain from surgery stopped and things improved didn't wear anymore.      Date of onset: 01/29/25    Relevant medical history: Overweight   Dates & types of surgery: neck january29    Prior diagnostic imaging/testing results: MRI; X-ray     Prior therapy history for the same diagnosis, illness or injury: Yes PT    Living Environment  Social support: With family members   Type of home: House   Stairs to enter the home: Yes 3 Is there a railing: Yes     Ramp: No   Stairs inside the home: Yes 10 Is there a railing: No     Help at home: None  Equipment owned:       Employment: Yes    Hobbies/Interests: outdoor stuff  hockey softball    Patient goals for therapy: all activities    Pain assessment:      Objective   CERVICAL SPINE EVALUATION  PAIN: Pain Level at Rest: 0/10  Pain Level with Use: 4/10  Pain Location: cervical spine  INTEGUMENTARY (edema, incisions): WNL incision well healing   POSTURE: WNL  ROM:   Cervical AROM: all min limited stiffness painfree.   MYOTOMES:  5/5 all planes   DTR S: WNL  DERMATOMES: WNL  NEURAL TENSION:  negative   PALPATION: non tender to L UT, scalenes, cervical paraspinals   Shoulder: ER: 4/5, IR 4+/5, flexion 4+/5, abduction 4+/5. Shoulder AROM: all WNL painfree     Assessment & Plan   CLINICAL IMPRESSIONS  Medical Diagnosis: S/P spinal surgery  Cervical radiculopathy    Treatment Diagnosis: S/P C5-6 hemilaminectomy, foraminotomy, discectomy   Impression/Assessment: Patient is a 49 year old male with neck complaints.  The following significant findings have been identified: Pain, Decreased ROM/flexibility, Decreased joint mobility, Decreased strength, Impaired sensation, Inflammation, Impaired muscle performance, Decreased activity tolerance, and Impaired posture. These impairments interfere with their ability to perform self care tasks, work tasks, recreational activities, household chores, driving , household mobility, and community mobility as compared to previous level of function.     Clinical Decision Making (Complexity):  Clinical Presentation: Stable/Uncomplicated  Clinical Presentation Rationale: based on medical and personal factors listed in PT evaluation  Clinical Decision Making (Complexity): Low complexity    PLAN OF CARE  Treatment Interventions:  Modalities: Cryotherapy, Cupping, E-stim, Mechanical Traction  Interventions: Gait Training, Manual Therapy, Neuromuscular Re-education, Therapeutic Activity, Therapeutic Exercise, Self-Care/Home Management    Long Term Goals     PT Goal 1  Goal Identifier: Prolonged Positioning  Goal Description: The patient will be able to sustain a  position, either sitting or standing x30 minutes with appropriate posture and pain <3/10.  Rationale: to maximize safety and independence with performance of ADLs and functional tasks;to maximize safety and independence with self cares  Target Date: 05/15/25  PT Goal 2  Goal Identifier: Throwing  Goal Description: The patient will be able to throw x30 reps with pain <3/10.  Rationale: to maximize safety and independence with performance of ADLs and functional tasks  Target Date: 05/15/25      Frequency of Treatment: 1x/week  Duration of Treatment: 8 weeks    Recommended Referrals to Other Professionals:   Education Assessment:   Learner/Method: Patient  Education Comments: Eager to participate in therapy. Patient demonstrates understanding of plan of care and consents to treatment.    Risks and benefits of evaluation/treatment have been explained.   Patient/Family/caregiver agrees with Plan of Care.     Evaluation Time:             Signing Clinician: Doris Prince, PT

## 2025-03-26 ENCOUNTER — MYC REFILL (OUTPATIENT)
Dept: INTERNAL MEDICINE | Facility: CLINIC | Age: 50
End: 2025-03-26
Payer: COMMERCIAL

## 2025-03-26 DIAGNOSIS — G47.00 INSOMNIA, UNSPECIFIED TYPE: ICD-10-CM

## 2025-03-27 ENCOUNTER — THERAPY VISIT (OUTPATIENT)
Dept: PHYSICAL THERAPY | Facility: REHABILITATION | Age: 50
End: 2025-03-27
Attending: SURGERY
Payer: COMMERCIAL

## 2025-03-27 DIAGNOSIS — M54.12 CERVICAL RADICULOPATHY: ICD-10-CM

## 2025-03-27 DIAGNOSIS — Z98.890 S/P SPINAL SURGERY: Primary | ICD-10-CM

## 2025-03-27 RX ORDER — ZOLPIDEM TARTRATE 5 MG/1
TABLET ORAL
Qty: 30 TABLET | Refills: 0 | Status: SHIPPED | OUTPATIENT
Start: 2025-03-27

## 2025-04-17 ENCOUNTER — THERAPY VISIT (OUTPATIENT)
Dept: PHYSICAL THERAPY | Facility: REHABILITATION | Age: 50
End: 2025-04-17
Payer: COMMERCIAL

## 2025-04-17 DIAGNOSIS — Z98.890 S/P SPINAL SURGERY: Primary | ICD-10-CM

## 2025-04-17 DIAGNOSIS — M54.12 CERVICAL RADICULOPATHY: ICD-10-CM

## 2025-05-01 ENCOUNTER — THERAPY VISIT (OUTPATIENT)
Dept: PHYSICAL THERAPY | Facility: REHABILITATION | Age: 50
End: 2025-05-01
Payer: COMMERCIAL

## 2025-05-01 DIAGNOSIS — M54.12 CERVICAL RADICULOPATHY: ICD-10-CM

## 2025-05-01 DIAGNOSIS — Z98.890 S/P SPINAL SURGERY: Primary | ICD-10-CM

## 2025-05-01 NOTE — PROGRESS NOTES
05/01/25 0500   Appointment Info   Signing clinician's name / credentials Doris Prince, PT DPT   Total/Authorized Visits [employer]   Visits Used 4   Medical Diagnosis S/P spinal surgery  Cervical radiculopathy   PT Tx Diagnosis S/P C5-6 hemilaminectomy, foraminotomy, discectomy   Precautions/Limitations 25# through 12 weeks   Progress Note/Certification   Onset of illness/injury or Date of Surgery 01/29/25   Therapy Frequency 1x/week   Predicted Duration 8 weeks   Progress Note Completed Date 03/20/25   GOALS   PT Goals 2   PT Goal 1   Goal Identifier Prolonged Positioning   Goal Description The patient will be able to sustain a position, either sitting or standing x30 minutes with appropriate posture and pain <3/10.   Rationale to maximize safety and independence with performance of ADLs and functional tasks;to maximize safety and independence with self cares   Goal Progress goal met   Target Date 05/15/25   Date Met 05/01/25   PT Goal 2   Goal Identifier Throwing   Goal Description The patient will be able to throw x30 reps with pain <3/10.   Rationale to maximize safety and independence with performance of ADLs and functional tasks   Goal Progress goal met   Target Date 05/15/25   Date Met 05/01/25   Subjective Report   Subjective Report The patient reports that overall he is doing very well. Is noticing no pain, some mild improvement in numbness in his finger. Has returned to throwing and most activities without difficulty. Feels comfortable continuing indepdently at this time.   Objective Measures   Objective Measures Objective Measure 1;Objective Measure 2;Objective Measure 3   Objective Measure 1   Objective Measure Pain Rating   Details 3/10   Objective Measure 2   Objective Measure UE strength: 5/5 all planes   Treatment Interventions (PT)   Interventions Therapeutic Procedure/Exercise;Neuromuscular Re-education   Therapeutic Procedure/Exercise   Therapeutic Procedures: strength, endurance, ROM,  flexibility minutes (01581) 15   Therapeutic Procedures Ther Proc 2   Ther Proc 1 UBE   Ther Proc 1 - Details 2:2 with subjective assessment   PTRx Ther Proc 12 Theraband Shoulder External Rotation at 90/90   PTRx Ther Proc 12 - Details x10 green tubing walk outs also performed actively and with quick throws today    PTRx Ther Proc 13 Shoulder Horizontal Abduction/Diagonals With Theraband   PTRx Ther Proc 13 - Details x10 GTB    PTRx Ther Proc 14 Push-Up Plus At Counter   PTRx Ther Proc 14 - Details verbal review   Skilled Intervention reviewed and progressed   Patient Response/Progress tolerated well   PTRx Ther Proc 15 Shoulder Press Dumbells Bilateral   PTRx Ther Proc 15 - Details 5# x10 arms in neutral x10 arms abducted to 90    Neuromuscular Re-education   PTRx Neuro Re-ed 1 Median Nerve Tensioners   PTRx Neuro Re-ed 1 - Details No Notes   PTRx Neuro Re-ed 2 Shoulder Theraband Rows   PTRx Neuro Re-ed 2 - Details red tubing x10 today    PTRx Neuro Re-ed 3 Shoulder Theraband Low Row/Pulldown   PTRx Neuro Re-ed 3 - Details red tubing x10 today    PTRx Neuro Re-ed 4 Serratus Slides on Wall   PTRx Neuro Re-ed 4 - Details with pillow case x10    Skilled Intervention cueing and education   Patient Response/Progress tolerated well   Education   Learner/Method Patient   Education Comments Eager to participate in therapy. Patient demonstrates understanding of plan of care and consents to treatment.   Plan   Home program see PTRX on phone   Plan for next session progress HEP as tolerated RC strength   Comments   Comments The patient has attended PT consistently and is demonstrating improvements in pain, mobility and function. At this point goals have been met and they feel comfortable continuing with independent management of their condition. Advised the patient to return to therapy as needed in the future and to continue with prescribed HEP. The patient will be discharged at this time.   Total Session Time   Timed Code  Treatment Minutes 15   Total Treatment Time (sum of timed and untimed services) 15         DISCHARGE  Reason for Discharge: Patient has met all goals.      Discharge Plan: Patient to continue home program.    Referring Provider:  Brittani Wilkes

## 2025-05-23 ENCOUNTER — MYC REFILL (OUTPATIENT)
Dept: INTERNAL MEDICINE | Facility: CLINIC | Age: 50
End: 2025-05-23
Payer: COMMERCIAL

## 2025-05-23 DIAGNOSIS — G47.00 INSOMNIA, UNSPECIFIED TYPE: ICD-10-CM

## 2025-05-27 RX ORDER — ZOLPIDEM TARTRATE 5 MG/1
TABLET ORAL
Qty: 30 TABLET | Refills: 0 | Status: SHIPPED | OUTPATIENT
Start: 2025-05-27

## 2025-06-09 ENCOUNTER — PATIENT OUTREACH (OUTPATIENT)
Dept: CARE COORDINATION | Facility: CLINIC | Age: 50
End: 2025-06-09
Payer: COMMERCIAL

## 2025-06-23 ENCOUNTER — PATIENT OUTREACH (OUTPATIENT)
Dept: CARE COORDINATION | Facility: CLINIC | Age: 50
End: 2025-06-23
Payer: COMMERCIAL

## 2025-07-24 ENCOUNTER — MYC REFILL (OUTPATIENT)
Dept: INTERNAL MEDICINE | Facility: CLINIC | Age: 50
End: 2025-07-24
Payer: COMMERCIAL

## 2025-07-24 DIAGNOSIS — G47.00 INSOMNIA, UNSPECIFIED TYPE: ICD-10-CM

## 2025-07-24 RX ORDER — ZOLPIDEM TARTRATE 5 MG/1
TABLET ORAL
Qty: 30 TABLET | Refills: 1 | Status: SHIPPED | OUTPATIENT
Start: 2025-07-24

## 2025-08-09 ENCOUNTER — HEALTH MAINTENANCE LETTER (OUTPATIENT)
Age: 50
End: 2025-08-09

## (undated) DEVICE — PREP CHLORAPREP W/ORANGE TINT 10.5ML 930715

## (undated) DEVICE — GLOVE UNDER INDICATOR PI SZ 6.5 LF 41665

## (undated) DEVICE — ESU ELEC BLADE 2.75" COATED/INSULATED E1455

## (undated) DEVICE — NEEDLE SPINAL DISP 22GA X 3.5" QUINCKE 333320

## (undated) DEVICE — TUBING SUCTION MEDI-VAC 1/4"X20' N620A

## (undated) DEVICE — CUSTOM PACK LUMBAR FUSION SNE5BLFHEA

## (undated) DEVICE — VIAL DECANTER STERILE WHITE DYNJDEC06

## (undated) DEVICE — RX SURGIFLO HEMOSTATIC MATRIX W/THROMBIN 8ML 2994

## (undated) DEVICE — PIN SKULL STAINLESS STEEL STERILE ADULT SP-001

## (undated) DEVICE — Device

## (undated) DEVICE — SOL WATER IRRIG 1000ML BOTTLE 2F7114

## (undated) DEVICE — PITCHER STERILE 1000ML  SSK9004A

## (undated) DEVICE — IMM COLLAR CERVICAL MED UNIVERSAL 2.5X24" 79-83520

## (undated) DEVICE — ESU GROUND PAD ADULT REM W/15' CORD E7507DB

## (undated) DEVICE — SUTURE MONOCRYL+ 4-0 PS-2 27IN MCP426H

## (undated) DEVICE — DRSG ISLAND 4X4" SQUARE LF MSC3244

## (undated) DEVICE — PREP DYNA-HEX 4% CHG SCRUB 4OZ BOTTLE MDS098710

## (undated) DEVICE — TOOL DISSECT MIDAS MR8 14CM MATCH HEAD 3MM MR8-14MH30

## (undated) DEVICE — TRAY PREP DRY SKIN SCRUB 067

## (undated) DEVICE — DRSG PRIMAPORE 03 1/8X6" 66000318

## (undated) DEVICE — SUCTION MANIFOLD NEPTUNE 2 SYS 1 PORT 702-025-000

## (undated) DEVICE — SPONGE NEURO 1/2X1/2 WECK 200100

## (undated) DEVICE — SU ETHILON 2-0 FS 18" 664G

## (undated) DEVICE — SU VICRYL+ 0 8-18 CT1/CR UND VCP840D

## (undated) DEVICE — SOL ISOPROPYL RUBBING ALCOHOL USP 70% 4OZ HDX-20 I0020

## (undated) DEVICE — ESU PENCIL SMOKE EVAC W/ROCKER SWITCH 0703-047-000

## (undated) DEVICE — GOWN IMPERVIOUS BREATHABLE SMART LG 89015

## (undated) DEVICE — PROTECTOR ARM STANDARD ONE STEP 40433 (COI)

## (undated) DEVICE — NEEDLE HYPO 18X1-1/2 SAFETY 305918

## (undated) DEVICE — SOL NACL 0.9% IRRIG 1000ML BOTTLE 2F7124

## (undated) DEVICE — SU DERMABOND ADVANCED .7ML DNX12

## (undated) RX ORDER — GINSENG 100 MG
CAPSULE ORAL
Status: DISPENSED
Start: 2025-01-29

## (undated) RX ORDER — DEXAMETHASONE SODIUM PHOSPHATE 10 MG/ML
INJECTION, SOLUTION INTRAMUSCULAR; INTRAVENOUS
Status: DISPENSED
Start: 2025-01-29

## (undated) RX ORDER — ONDANSETRON 2 MG/ML
INJECTION INTRAMUSCULAR; INTRAVENOUS
Status: DISPENSED
Start: 2025-01-29

## (undated) RX ORDER — LIDOCAINE HYDROCHLORIDE 10 MG/ML
INJECTION, SOLUTION EPIDURAL; INFILTRATION; INTRACAUDAL; PERINEURAL
Status: DISPENSED
Start: 2025-01-29

## (undated) RX ORDER — FENTANYL CITRATE 50 UG/ML
INJECTION, SOLUTION INTRAMUSCULAR; INTRAVENOUS
Status: DISPENSED
Start: 2025-01-29

## (undated) RX ORDER — PROPOFOL 10 MG/ML
INJECTION, EMULSION INTRAVENOUS
Status: DISPENSED
Start: 2025-01-29

## (undated) RX ORDER — BUPIVACAINE HYDROCHLORIDE AND EPINEPHRINE 2.5; 5 MG/ML; UG/ML
INJECTION, SOLUTION EPIDURAL; INFILTRATION; INTRACAUDAL; PERINEURAL
Status: DISPENSED
Start: 2025-01-29